# Patient Record
Sex: FEMALE | Race: BLACK OR AFRICAN AMERICAN | NOT HISPANIC OR LATINO | Employment: OTHER | URBAN - METROPOLITAN AREA
[De-identification: names, ages, dates, MRNs, and addresses within clinical notes are randomized per-mention and may not be internally consistent; named-entity substitution may affect disease eponyms.]

---

## 2017-03-27 ENCOUNTER — OFFICE VISIT (OUTPATIENT)
Dept: INTERNAL MEDICINE | Facility: CLINIC | Age: 42
End: 2017-03-27
Payer: MEDICARE

## 2017-03-27 VITALS
BODY MASS INDEX: 27.34 KG/M2 | WEIGHT: 148.56 LBS | SYSTOLIC BLOOD PRESSURE: 124 MMHG | TEMPERATURE: 97 F | HEIGHT: 62 IN | RESPIRATION RATE: 14 BRPM | DIASTOLIC BLOOD PRESSURE: 80 MMHG | HEART RATE: 80 BPM

## 2017-03-27 DIAGNOSIS — I34.1 MVP (MITRAL VALVE PROLAPSE): ICD-10-CM

## 2017-03-27 DIAGNOSIS — K21.9 GASTROESOPHAGEAL REFLUX DISEASE, ESOPHAGITIS PRESENCE NOT SPECIFIED: ICD-10-CM

## 2017-03-27 DIAGNOSIS — Q85.01 NEUROFIBROMATOSIS, TYPE I (VON RECKLINGHAUSEN'S DISEASE): ICD-10-CM

## 2017-03-27 DIAGNOSIS — E78.00 PURE HYPERCHOLESTEROLEMIA: ICD-10-CM

## 2017-03-27 DIAGNOSIS — F31.9 BIPOLAR AFFECTIVE DISORDER, REMISSION STATUS UNSPECIFIED: Primary | ICD-10-CM

## 2017-03-27 PROCEDURE — 99204 OFFICE O/P NEW MOD 45 MIN: CPT | Mod: S$GLB,,, | Performed by: INTERNAL MEDICINE

## 2017-03-27 PROCEDURE — 1160F RVW MEDS BY RX/DR IN RCRD: CPT | Mod: S$GLB,,, | Performed by: INTERNAL MEDICINE

## 2017-03-27 PROCEDURE — 99999 PR PBB SHADOW E&M-EST. PATIENT-LVL III: CPT | Mod: PBBFAC,,, | Performed by: INTERNAL MEDICINE

## 2017-03-27 RX ORDER — OXCARBAZEPINE 600 MG/1
600 TABLET, FILM COATED ORAL 2 TIMES DAILY
Refills: 0 | COMMUNITY
Start: 2017-03-03 | End: 2017-03-27 | Stop reason: ALTCHOICE

## 2017-03-27 RX ORDER — OLANZAPINE 10 MG/1
10 TABLET ORAL NIGHTLY
Refills: 0 | COMMUNITY
Start: 2017-03-03 | End: 2019-02-11

## 2017-03-27 RX ORDER — TRAZODONE HYDROCHLORIDE 50 MG/1
50 TABLET ORAL NIGHTLY
Refills: 0 | COMMUNITY
Start: 2017-03-01 | End: 2017-10-11

## 2017-03-27 RX ORDER — METOPROLOL SUCCINATE 25 MG/1
25 TABLET, EXTENDED RELEASE ORAL DAILY
Qty: 90 TABLET | Refills: 3 | Status: SHIPPED | OUTPATIENT
Start: 2017-03-27 | End: 2018-06-26 | Stop reason: SDUPTHER

## 2017-03-27 RX ORDER — ATORVASTATIN CALCIUM 10 MG/1
10 TABLET, FILM COATED ORAL DAILY
Qty: 90 TABLET | Refills: 4 | Status: SHIPPED | OUTPATIENT
Start: 2017-03-27 | End: 2018-06-26 | Stop reason: SDUPTHER

## 2017-03-27 NOTE — MR AVS SNAPSHOT
[facility] - Internal Medicine  [address] [place], [place] [address], [address]  [place] [address]  Phone: [phone]  Fax: [phone]                  Kenzie Saunders   3/27/2017 2:30 PM   Office Visit    Description:  Female : 1975   Provider:  José Luis Adams MD   Department:  [facility] - Internal Medicine           Reason for Visit     Establish Care           Diagnoses this Visit        Comments    Bipolar affective disorder, remission status unspecified    -  Primary     Gastroesophageal reflux disease, esophagitis presence not specified         Neurofibromatosis, type I (von Recklinghausen's disease)         MVP (mitral valve prolapse)         Pure hypercholesterolemia                To Do List           Goals (5 Years of Data)     None      Follow-Up and Disposition     Return in about 1 month (around 2017).       These Medications        Disp Refills Start End    atorvastatin (LIPITOR) 10 MG tablet 90 tablet 4 3/27/2017 3/27/2018    Take 1 tablet (10 mg total) by mouth once daily. - Oral    Pharmacy: [facility]/pharmacy #[id number] - [address] Ph #: [phone]       metoprolol succinate (TOPROL-XL) 25 MG 24 hr tablet 90 tablet 3 3/27/2017 3/27/2018    Take 1 tablet (25 mg total) by mouth once daily. - Oral    Pharmacy: [facility]/pharmacy #[id number] [address] Ph #: [phone]         [facility]     [facility] On Call Nurse Care Line -  Assistance  Registered nurses in the [facility] Center provide clinical advisement, health education, appointment booking, and other advisory services.  Call for this free service at [phone].             Medications           Message regarding Medications     Verify the changes and/or additions to your medication regime listed below are the same as discussed with your clinician today.  If any of these changes or additions are incorrect, please notify your  "healthcare provider.        START taking these NEW medications        Refills    atorvastatin (LIPITOR) 10 MG tablet 4    Sig: Take 1 tablet (10 mg total) by mouth once daily.    Class: Normal    Route: Oral    metoprolol succinate (TOPROL-XL) 25 MG 24 hr tablet 3    Sig: Take 1 tablet (25 mg total) by mouth once daily.    Class: Normal    Route: Oral      STOP taking these medications     oxcarbazepine (TRILEPTAL) 300 MG Tab TAKE 1 TABLET BY MOUTH AT BEDTIME FOR 3 DAYS THEN 1 TABLET TWICE A DAY    oxcarbazepine (TRILEPTAL) 600 MG Tab Take 600 mg by mouth 2 (two) times daily.           Verify that the below list of medications is an accurate representation of the medications you are currently taking.  If none reported, the list may be blank. If incorrect, please contact your healthcare provider. Carry this list with you in case of emergency.           Current Medications     atorvastatin (LIPITOR) 10 MG tablet Take 1 tablet (10 mg total) by mouth once daily.    DIVALPROEX SODIUM (DEPAKOTE ORAL) Take by mouth 2 (two) times daily. Patient hasn't picked up medication from pharmacy yet unsure of milligrams.    metoprolol succinate (TOPROL-XL) 25 MG 24 hr tablet Take 1 tablet (25 mg total) by mouth once daily.    olanzapine (ZYPREXA) 10 MG tablet Take 10 mg by mouth every evening.    trazodone (DESYREL) 50 MG tablet Take 50 mg by mouth every evening.           Clinical Reference Information           Your Vitals Were     BP Pulse Temp Resp Height Weight    124/80 80 97.1 °F (36.2 °C) 14 5' 2" (1.575 m) 67.4 kg (148 lb 9.4 oz)    BMI                27.18 kg/m2          Blood Pressure          Most Recent Value    BP  124/80      Allergies as of 3/27/2017     Haldol [Haloperidol Lactate]    Prevacid [Lansoprazole]    Risperidone Analogues      Immunizations Administered on Date of Encounter - 3/27/2017     None      Orders Placed During Today's Visit     Future Labs/Procedures Expected by Expires    Lipid panel  3/27/2017 " 5/26/2018      Language Assistance Services     ATTENTION: Language assistance services are available, free of charge. Please call [phone].      ATENCIÓN: Si solomon lux, tiene a simpson disposición servicios gratuitos de asistencia lingüística. Llame al [phone].     CHÚ Ý: N?u b?n nói Ti?ng Vi?t, có các d?ch v? h? tr? ngôn ng? mi?n phí dành cho b?n. G?i s? [phone].         [facility] complies with applicable Federal civil rights laws and does not discriminate on the basis of race, color, national origin, age, disability, or sex.

## 2017-03-27 NOTE — PROGRESS NOTES
Subjective:       Patient ID: Kenzie Saunders is a 41 y.o. female.    Chief Complaint: Establish Care    HPI  Pt establishing care.  Has a history of BAD, neurofibromatosis, MVP, GERD.  Pt c/o almost constant palpitations, no CP,SOB.  No recent depression or madelyn.  GERD is rare.  Had a slightly elevated LDL cholesterol last year, has a + FH of CAD.  Review of Systems   All other systems reviewed and are negative.      Objective:      Physical Exam   Constitutional: She appears well-developed. No distress.   HENT:   Head: Normocephalic.   Mouth/Throat: Oropharynx is clear and moist.   Eyes: EOM are normal.   Neck: Normal range of motion. No JVD present. No tracheal deviation present.   Cardiovascular: Normal rate, regular rhythm and intact distal pulses.    Murmur (2/6 SM LSB) heard.  Pulmonary/Chest: Effort normal and breath sounds normal. No respiratory distress.   Abdominal: Soft. Bowel sounds are normal. She exhibits no distension and no mass. There is no tenderness.   Musculoskeletal: Normal range of motion. She exhibits no edema.   Neurological: She is alert. No cranial nerve deficit. She exhibits normal muscle tone. Coordination normal.   Skin: Skin is warm and dry. No rash noted. She is not diaphoretic. No erythema.   Widespread neurofibromas   Psychiatric: She has a normal mood and affect. Her behavior is normal.   Vitals reviewed.      Assessment:       1. Bipolar affective disorder, remission status unspecified    2. Gastroesophageal reflux disease, esophagitis presence not specified    3. Neurofibromatosis, type I (von Recklinghausen's disease)    4. MVP (mitral valve prolapse)    5. Pure hypercholesterolemia        Plan:       Kenzie was seen today for establish care.    Diagnoses and all orders for this visit:    Bipolar affective disorder, remission status unspecified   Well-cont    Gastroesophageal reflux disease, esophagitis presence not specified   PPI prn    Neurofibromatosis, type I (von  Recklinghausen's disease)    MVP (mitral valve prolapse)  -     metoprolol succinate (TOPROL-XL) 25 MG 24 hr tablet; Take 1 tablet (25 mg total) by mouth once daily.    Pure hypercholesterolemia  -     Lipid panel; Future  -     atorvastatin (LIPITOR) 10 MG tablet; Take 1 tablet (10 mg total) by mouth once daily.      Return in about 1 month (around 4/27/2017).

## 2017-04-20 ENCOUNTER — LAB VISIT (OUTPATIENT)
Dept: LAB | Facility: HOSPITAL | Age: 42
End: 2017-04-20
Attending: INTERNAL MEDICINE
Payer: MEDICARE

## 2017-04-20 DIAGNOSIS — E78.00 PURE HYPERCHOLESTEROLEMIA: ICD-10-CM

## 2017-04-20 LAB
CHOLEST/HDLC SERPL: 2.9 {RATIO}
HDL/CHOLESTEROL RATIO: 34.5 %
HDLC SERPL-MCNC: 168 MG/DL
HDLC SERPL-MCNC: 58 MG/DL
LDLC SERPL CALC-MCNC: 92.6 MG/DL
NONHDLC SERPL-MCNC: 110 MG/DL
TRIGL SERPL-MCNC: 87 MG/DL

## 2017-04-20 PROCEDURE — 80061 LIPID PANEL: CPT

## 2017-04-20 PROCEDURE — 36415 COLL VENOUS BLD VENIPUNCTURE: CPT | Mod: PO

## 2017-04-27 ENCOUNTER — OFFICE VISIT (OUTPATIENT)
Dept: INTERNAL MEDICINE | Facility: CLINIC | Age: 42
End: 2017-04-27
Payer: MEDICARE

## 2017-04-27 VITALS
HEART RATE: 72 BPM | HEIGHT: 62 IN | DIASTOLIC BLOOD PRESSURE: 72 MMHG | SYSTOLIC BLOOD PRESSURE: 100 MMHG | TEMPERATURE: 98 F | RESPIRATION RATE: 14 BRPM | WEIGHT: 161 LBS | BODY MASS INDEX: 29.63 KG/M2

## 2017-04-27 DIAGNOSIS — K21.9 GASTROESOPHAGEAL REFLUX DISEASE, ESOPHAGITIS PRESENCE NOT SPECIFIED: ICD-10-CM

## 2017-04-27 DIAGNOSIS — I34.1 MVP (MITRAL VALVE PROLAPSE): Primary | ICD-10-CM

## 2017-04-27 DIAGNOSIS — Q85.01 NEUROFIBROMATOSIS, TYPE I (VON RECKLINGHAUSEN'S DISEASE): ICD-10-CM

## 2017-04-27 DIAGNOSIS — Z23 NEED FOR PROPHYLACTIC VACCINATION AND INOCULATION AGAINST OTHER SPECIFIED DISEASE: ICD-10-CM

## 2017-04-27 DIAGNOSIS — Z80.3 FAMILY HISTORY OF BREAST CANCER IN SISTER: ICD-10-CM

## 2017-04-27 DIAGNOSIS — E78.00 PURE HYPERCHOLESTEROLEMIA: ICD-10-CM

## 2017-04-27 PROCEDURE — 99999 PR PBB SHADOW E&M-EST. PATIENT-LVL III: CPT | Mod: PBBFAC,,, | Performed by: INTERNAL MEDICINE

## 2017-04-27 PROCEDURE — 99213 OFFICE O/P EST LOW 20 MIN: CPT | Mod: 25,S$GLB,, | Performed by: INTERNAL MEDICINE

## 2017-04-27 PROCEDURE — 90471 IMMUNIZATION ADMIN: CPT | Mod: S$GLB,,, | Performed by: INTERNAL MEDICINE

## 2017-04-27 PROCEDURE — 1160F RVW MEDS BY RX/DR IN RCRD: CPT | Mod: S$GLB,,, | Performed by: INTERNAL MEDICINE

## 2017-04-27 PROCEDURE — 90715 TDAP VACCINE 7 YRS/> IM: CPT | Mod: S$GLB,,, | Performed by: INTERNAL MEDICINE

## 2017-04-27 RX ORDER — DIVALPROEX SODIUM 250 MG/1
TABLET, FILM COATED, EXTENDED RELEASE ORAL
Refills: 0 | COMMUNITY
Start: 2017-03-31 | End: 2019-02-11

## 2017-04-27 NOTE — MR AVS SNAPSHOT
[facility] - Internal Medicine  [address] [place] [address] [address], [address]  [place] [address]  Phone: [phone]  Fax: [phone]                  Kenzie Saunders   2017 9:15 AM   Office Visit    Description:  Female : 1975   Provider:  José Luis Adams MD   Department:  [facility] - Internal Medicine           Reason for Visit     Follow-up     Results     Back Pain           Diagnoses this Visit        Comments    MVP (mitral valve prolapse)    -  Primary     Family history of breast cancer in sister         Pure hypercholesterolemia         Neurofibromatosis, type I (von Recklinghausen's disease)         Gastroesophageal reflux disease, esophagitis presence not specified         Need for prophylactic vaccination and inoculation against other specified disease                To Do List           Goals (5 Years of Data)     None      Follow-Up and Disposition     Return in about 1 year (around 2018).      [facility] On Call     [facility] On Call Nurse Care Line -  Assistance  Unless otherwise directed by your provider, please contact [facility], our nurse care line that is available for  assistance.     Registered nurses in the [facility] Call Center provide: appointment scheduling, clinical advisement, health education, and other advisory services.  Call: [phone] (toll free)               Medications           Message regarding Medications     Verify the changes and/or additions to your medication regime listed below are the same as discussed with your clinician today.  If any of these changes or additions are incorrect, please notify your healthcare provider.        STOP taking these medications     DIVALPROEX SODIUM (DEPAKOTE ORAL) Take by mouth 2 (two) times daily. Patient hasn't picked up medication from pharmacy yet unsure of milligrams.           Verify that the below list of medications is an accurate representation of the medications you are currently taking.  If none reported, the  "list may be blank. If incorrect, please contact your healthcare provider. Carry this list with you in case of emergency.           Current Medications     atorvastatin (LIPITOR) 10 MG tablet Take 1 tablet (10 mg total) by mouth once daily.    divalproex ER (DEPAKOTE ER) 250 MG 24 hr tablet TAKE 1 TABLET BY MOUTH AT BEDTIME FOR 7 DAYS THEN 1 TABLET BY MOUTH TWICE A DAY    metoprolol succinate (TOPROL-XL) 25 MG 24 hr tablet Take 1 tablet (25 mg total) by mouth once daily.    olanzapine (ZYPREXA) 10 MG tablet Take 10 mg by mouth every evening.    trazodone (DESYREL) 50 MG tablet Take 50 mg by mouth every evening.           Clinical Reference Information           Your Vitals Were     BP Pulse Temp Resp Height Weight    100/72 72 97.6 °F (36.4 °C) (Oral) 14 5' 2" (1.575 m) 73 kg (161 lb)    BMI                29.45 kg/m2          Blood Pressure          Most Recent Value    BP  100/72      Allergies as of 4/27/2017     Haldol [Haloperidol Lactate]    Prevacid [Lansoprazole]    Risperidone Analogues      Immunizations Administered on Date of Encounter - 4/27/2017     Name Date Dose VIS Date Route    TDAP  Incomplete 0.5 mL 2/24/2015 Intramuscular      Orders Placed During Today's Visit      Normal Orders This Visit    Tdap Vaccine     Future Labs/Procedures Expected by Expires    Mammo Digital Screening Bilat with CAD  4/27/2017 6/27/2018      Language Assistance Services     ATTENTION: Language assistance services are available, free of charge. Please call [phone].      ATENCIÓN: Si habla español, tiene a simpson disposición servicios gratuitos de asistencia lingüística. Llame al [phone].     [facility] Ý: N?u b?n nói Ti?ng Vi?t, có các d?ch v? h? tr? ngôn ng? mi?n phí dành cho b?n. G?i s? [phone].         [facility] - Internal Medicine complies with applicable Federal civil rights laws and does not discriminate on the basis of race, color, national origin, age, disability, or sex.        "

## 2017-04-27 NOTE — PROGRESS NOTES
Subjective:       Patient ID: Kenzie Saunders is a 41 y.o. female.    Chief Complaint: Follow-up; Results; and Back Pain (lower back and left leg)    HPI  Recheck  Labs reviewed.  Having much less palpitations.  Having occasional orthostasis.  Has some minor LBP.  Review of Systems    Objective:      Physical Exam   Constitutional: She appears well-developed. No distress.   HENT:   Head: Normocephalic.   Mouth/Throat: Oropharynx is clear and moist.   Eyes: EOM are normal.   Neck: Normal range of motion. No JVD present. No tracheal deviation present.   Cardiovascular: Normal rate, regular rhythm and intact distal pulses.    Murmur (2/6 SM LSB) heard.  Pulmonary/Chest: Effort normal and breath sounds normal. No respiratory distress.   Abdominal: Soft. Bowel sounds are normal. She exhibits no distension and no mass. There is no tenderness.   Musculoskeletal: Normal range of motion. She exhibits no edema.   Neurological: She is alert. No cranial nerve deficit. She exhibits normal muscle tone. Coordination normal.   Skin: Skin is warm and dry. No rash noted. She is not diaphoretic. No erythema.   Widespread neurofibromas   Psychiatric: She has a normal mood and affect. Her behavior is normal.   Vitals reviewed.      Assessment:       1. MVP (mitral valve prolapse)    2. Family history of breast cancer in sister    3. Pure hypercholesterolemia    4. Neurofibromatosis, type I (von Recklinghausen's disease)    5. Gastroesophageal reflux disease, esophagitis presence not specified    6. Need for prophylactic vaccination and inoculation against other specified disease        Plan:       Kenzie was seen today for follow-up, results and back pain.    Diagnoses and all orders for this visit:    MVP (mitral valve prolapse)   Controlled    Family history of breast cancer in sister  -     Mammo Digital Screening Bilat with CAD; Future    Pure hypercholesterolemia   Well-cont    Neurofibromatosis, type I (von Recklinghausen's  disease)    Gastroesophageal reflux disease, esophagitis presence not specified    Need for prophylactic vaccination and inoculation against other specified disease  -     Tdap Vaccine      Return in about 1 year (around 4/27/2018).

## 2017-05-05 ENCOUNTER — HOSPITAL ENCOUNTER (OUTPATIENT)
Dept: RADIOLOGY | Facility: HOSPITAL | Age: 42
Discharge: HOME | End: 2017-05-05
Attending: INTERNAL MEDICINE
Payer: MEDICARE

## 2017-05-05 DIAGNOSIS — Z80.3 FAMILY HISTORY OF BREAST CANCER IN SISTER: ICD-10-CM

## 2017-05-05 PROCEDURE — 77067 SCR MAMMO BI INCL CAD: CPT | Mod: TC

## 2017-10-11 ENCOUNTER — OFFICE VISIT (OUTPATIENT)
Dept: INTERNAL MEDICINE | Facility: CLINIC | Age: 42
End: 2017-10-11
Payer: MEDICARE

## 2017-10-11 VITALS
BODY MASS INDEX: 30.26 KG/M2 | DIASTOLIC BLOOD PRESSURE: 78 MMHG | TEMPERATURE: 98 F | WEIGHT: 164.44 LBS | HEIGHT: 62 IN | HEART RATE: 76 BPM | SYSTOLIC BLOOD PRESSURE: 111 MMHG | RESPIRATION RATE: 20 BRPM

## 2017-10-11 DIAGNOSIS — Z79.899 HIGH RISK MEDICATION USE: ICD-10-CM

## 2017-10-11 DIAGNOSIS — E66.9 OBESITY (BMI 30-39.9): Primary | ICD-10-CM

## 2017-10-11 DIAGNOSIS — Z23 NEED FOR PROPHYLACTIC VACCINATION AND INOCULATION AGAINST INFLUENZA: ICD-10-CM

## 2017-10-11 DIAGNOSIS — E78.00 PURE HYPERCHOLESTEROLEMIA: ICD-10-CM

## 2017-10-11 DIAGNOSIS — Q85.01 NEUROFIBROMATOSIS, TYPE I (VON RECKLINGHAUSEN'S DISEASE): ICD-10-CM

## 2017-10-11 DIAGNOSIS — F31.9 BIPOLAR AFFECTIVE DISORDER, REMISSION STATUS UNSPECIFIED: ICD-10-CM

## 2017-10-11 PROCEDURE — G0008 ADMIN INFLUENZA VIRUS VAC: HCPCS | Mod: S$GLB,,, | Performed by: INTERNAL MEDICINE

## 2017-10-11 PROCEDURE — 99214 OFFICE O/P EST MOD 30 MIN: CPT | Mod: S$GLB,,, | Performed by: INTERNAL MEDICINE

## 2017-10-11 PROCEDURE — 90686 IIV4 VACC NO PRSV 0.5 ML IM: CPT | Mod: S$GLB,,, | Performed by: INTERNAL MEDICINE

## 2017-10-11 PROCEDURE — 99999 PR PBB SHADOW E&M-EST. PATIENT-LVL III: CPT | Mod: PBBFAC,,, | Performed by: INTERNAL MEDICINE

## 2017-10-11 NOTE — PROGRESS NOTES
Subjective:       Patient ID: Kenzie Saunders is a 42 y.o. female.    Chief Complaint: Weight Gain (symptoms x 4 weeks, 10lb weight gain per psychatrist) and Flu Vaccine    HPI  Pt sent here by psych for weight gain. Pt has gained 37 lbs since 8/16.  She gets no exercise and drinks a lot of sugary drinks.  Pt c/o depression, tearfulness, just had her psych meds adjusted.  C/O chronic epigastric pain since lap choly 2 years ago.  Review of Systems   All other systems reviewed and are negative.      Objective:      Physical Exam   Constitutional: She appears well-developed. No distress.   HENT:   Head: Normocephalic.   Mouth/Throat: Oropharynx is clear and moist.   Eyes: EOM are normal.   Neck: Normal range of motion. No JVD present. No tracheal deviation present.   Cardiovascular: Normal rate, regular rhythm and intact distal pulses.    Murmur (2/6 SM LSB) heard.  Pulmonary/Chest: Effort normal and breath sounds normal. No respiratory distress.   Abdominal: Soft. Bowel sounds are normal. She exhibits no distension and no mass. There is no tenderness.   Musculoskeletal: Normal range of motion. She exhibits no edema.   Neurological: She is alert. No cranial nerve deficit. She exhibits normal muscle tone. Coordination normal.   Skin: Skin is warm and dry. No rash noted. She is not diaphoretic. No erythema.   Widespread neurofibromas   Psychiatric: She has a normal mood and affect. Her behavior is normal.   Vitals reviewed.      Assessment:       1. Obesity (BMI 30-39.9)    2. Pure hypercholesterolemia    3. Neurofibromatosis, type I (von Recklinghausen's disease)    4. Bipolar affective disorder, remission status unspecified    5. High risk medication use    6. Need for prophylactic vaccination and inoculation against influenza        Plan:       Kenzie was seen today for weight gain and flu vaccine.    Diagnoses and all orders for this visit:    Obesity (BMI 30-39.9)  -     TSH; Future  -     Ambulatory Referral to  [facility]   D/c sugary drinks    Pure hypercholesterolemia   Well-cont    Neurofibromatosis, type I (von Recklinghausen's disease)    Bipolar affective disorder, remission status unspecified    High risk medication use  -     CBC auto differential; Future  -     Comprehensive metabolic panel; Future    Need for prophylactic vaccination and inoculation against influenza  -     Influenza - Quadrivalent (3 years & older) (PF)      Return if symptoms worsen or fail to improve.

## 2017-10-13 ENCOUNTER — LAB VISIT (OUTPATIENT)
Dept: LAB | Facility: HOSPITAL | Age: 42
End: 2017-10-13
Attending: INTERNAL MEDICINE
Payer: MEDICARE

## 2017-10-13 DIAGNOSIS — E66.9 OBESITY (BMI 30-39.9): ICD-10-CM

## 2017-10-13 DIAGNOSIS — Z79.899 HIGH RISK MEDICATION USE: ICD-10-CM

## 2017-10-13 LAB
ALBUMIN SERPL BCP-MCNC: 3.5 G/DL
ALP SERPL-CCNC: 65 U/L
ALT SERPL W/O P-5'-P-CCNC: 19 U/L
ANION GAP SERPL CALC-SCNC: 9 MMOL/L
AST SERPL-CCNC: 18 U/L
BASOPHILS # BLD AUTO: 0.03 K/UL
BASOPHILS NFR BLD: 0.6 %
BILIRUB SERPL-MCNC: 0.3 MG/DL
BUN SERPL-MCNC: 11 MG/DL
CALCIUM SERPL-MCNC: 8.5 MG/DL
CHLORIDE SERPL-SCNC: 107 MMOL/L
CO2 SERPL-SCNC: 23 MMOL/L
CREAT SERPL-MCNC: 0.56 MG/DL
DIFFERENTIAL METHOD: ABNORMAL
EOSINOPHIL # BLD AUTO: 0.2 K/UL
EOSINOPHIL NFR BLD: 4 %
ERYTHROCYTE [DISTWIDTH] IN BLOOD BY AUTOMATED COUNT: 12.3 %
EST. GFR  (AFRICAN AMERICAN): >60 ML/MIN/1.73 M^2
EST. GFR  (NON AFRICAN AMERICAN): >60 ML/MIN/1.73 M^2
GLUCOSE SERPL-MCNC: 83 MG/DL
HCT VFR BLD AUTO: 37.2 %
HGB BLD-MCNC: 12.2 G/DL
LYMPHOCYTES # BLD AUTO: 1.9 K/UL
LYMPHOCYTES NFR BLD: 35 %
MCH RBC QN AUTO: 27.9 PG
MCHC RBC AUTO-ENTMCNC: 32.8 G/DL
MCV RBC AUTO: 85 FL
MONOCYTES # BLD AUTO: 0.9 K/UL
MONOCYTES NFR BLD: 16 %
NEUTROPHILS # BLD AUTO: 2.4 K/UL
NEUTROPHILS NFR BLD: 44 %
PLATELET # BLD AUTO: 240 K/UL
PMV BLD AUTO: 10.6 FL
POTASSIUM SERPL-SCNC: 3.6 MMOL/L
PROT SERPL-MCNC: 6.8 G/DL
RBC # BLD AUTO: 4.38 M/UL
SODIUM SERPL-SCNC: 139 MMOL/L
T4 FREE SERPL-MCNC: 0.81 NG/DL
TSH SERPL DL<=0.005 MIU/L-ACNC: 4.32 UIU/ML
WBC # BLD AUTO: 5.45 K/UL

## 2017-10-13 PROCEDURE — 85025 COMPLETE CBC W/AUTO DIFF WBC: CPT | Mod: PO

## 2017-10-13 PROCEDURE — 80053 COMPREHEN METABOLIC PANEL: CPT | Mod: PO

## 2017-10-13 PROCEDURE — 84443 ASSAY THYROID STIM HORMONE: CPT | Mod: PO

## 2017-10-13 PROCEDURE — 36415 COLL VENOUS BLD VENIPUNCTURE: CPT | Mod: PO

## 2017-10-13 PROCEDURE — 84439 ASSAY OF FREE THYROXINE: CPT

## 2017-10-23 ENCOUNTER — TELEPHONE (OUTPATIENT)
Dept: INTERNAL MEDICINE | Facility: CLINIC | Age: 42
End: 2017-10-23

## 2017-10-23 NOTE — TELEPHONE ENCOUNTER
----- Message from Onelia Gould sent at 10/23/2017  8:50 AM CDT -----  Contact: The pt called  Calling to get lab results.  ALSO, please mail a copy of lab results.  She can be reached at [phone]

## 2018-01-15 ENCOUNTER — OFFICE VISIT (OUTPATIENT)
Dept: NEUROLOGY | Facility: CLINIC | Age: 43
End: 2018-01-15
Payer: MEDICARE

## 2018-01-15 VITALS
HEART RATE: 65 BPM | WEIGHT: 164.44 LBS | HEIGHT: 62 IN | DIASTOLIC BLOOD PRESSURE: 77 MMHG | BODY MASS INDEX: 30.26 KG/M2 | SYSTOLIC BLOOD PRESSURE: 118 MMHG

## 2018-01-15 DIAGNOSIS — Q85.01 NEUROFIBROMATOSIS, TYPE 1 (VON RECKLINGHAUSEN'S DISEASE): Primary | ICD-10-CM

## 2018-01-15 PROCEDURE — 99214 OFFICE O/P EST MOD 30 MIN: CPT | Mod: S$GLB,,, | Performed by: PSYCHIATRY & NEUROLOGY

## 2018-01-15 PROCEDURE — 99999 PR PBB SHADOW E&M-EST. PATIENT-LVL IV: CPT | Mod: PBBFAC,,, | Performed by: PSYCHIATRY & NEUROLOGY

## 2018-01-15 RX ORDER — LORAZEPAM 1 MG/1
TABLET ORAL
Qty: 2 TABLET | Refills: 0 | Status: SHIPPED | OUTPATIENT
Start: 2018-01-15 | End: 2018-01-19 | Stop reason: SDUPTHER

## 2018-01-15 NOTE — PROGRESS NOTES
[facility] NEUROLOGY  [facility]    Date: January 15, 2018   Patient Name: Kenzie Saunders   MRN: [id number]   PCP: José Luis Adams  Referring Provider: No ref. provider found    Assessment:      This is Kenzie Saunders, 42 y.o. female with a history of neurofibromatosis type 1 who presents with left sided back and radicular leg pain with a large left lumbar plexiform neurofibroma.  We will obtain updated neuraxis imaging and have again referred patient to plastic surgery as neurofibromas rapidly increasing in size and likely contributing to patient's low back pain.     Plan:      Problem List Items Addressed This Visit     None      Visit Diagnoses     Neurofibromatosis, type 1 (von Recklinghausen's disease)    -  Primary    Relevant Orders    Ambulatory consult to Plastic Surgery    MRI Brain Without Contrast    MRI Cervical Spine Without Contrast    MRI Thoracic Spine W WO Contrast    MRI Lumbar Spine Without Contrast          Humberto Kenny MD  [facility]   Department of Neurology    Patient note was created using Dragon Dictation.  Any errors in syntax or even information may not have been identified and edited on initial review prior to signing this note.  Subjective:   Patient seen in consultation at the request of Dr. Shipley for the evaluation of back pain. A copy of this note will be sent to the referring physician.      HPI:   Ms. Kenzie Saunders is a 42 y.o. female who presents in follow-up for neurofibromatosis type I.  The patient continues to complain of lumbar back pain stating that she feels that the large pedunculated plexiform neurofibroma in her left lumbar region has increased in size and is worsening her back pain.  She states that the surgeon after her last visit here.  However, she states that she has weighed the mass and found to be over 5 pounds .  Believes this is likely worsening her low back pain.  She has no other complaints today.    PAST  "MEDICAL HISTORY:  Past Medical History:   Diagnosis Date    Astigmatism     Bipolar 2 disorder     GERD (gastroesophageal reflux disease)     Migraine headache     Neurofibromatosis, type 1 (von Recklinghausen's disease)     Sciatica      PAST SURGICAL HISTORY:  Past Surgical History:   Procedure Laterality Date    HYSTERECTOMY       CURRENT MEDS:  Current Outpatient Prescriptions   Medication Sig Dispense Refill    atorvastatin (LIPITOR) 10 MG tablet Take 1 tablet (10 mg total) by mouth once daily. 90 tablet 4    metoprolol succinate (TOPROL-XL) 25 MG 24 hr tablet Take 1 tablet (25 mg total) by mouth once daily. 90 tablet 3    olanzapine (ZYPREXA) 10 MG tablet Take 10 mg by mouth every evening.  0    divalproex ER (DEPAKOTE ER) 250 MG 24 hr tablet TAKE 1 TABLET BY MOUTH AT BEDTIME FOR 7 DAYS THEN 1 TABLET BY MOUTH TWICE A DAY  0     No current facility-administered medications for this visit.      ALLERGIES:  Allergies   Allergen Reactions    Haldol [Haloperidol Lactate] Hives    Prevacid [Lansoprazole] Hives    Risperidone Analogues Other (See Comments)     Elevates prolactin levels       FAMILY HISTORY:  Family History   Problem Relation Age of Onset    Neurofibromatosis Mother     Neurofibromatosis Maternal Aunt     Neurofibromatosis Maternal Grandfather     Neurofibromatosis Cousin      SOCIAL HISTORY:  Social History   Substance Use Topics    Smoking status: Never Smoker    Smokeless tobacco: Never Used    Alcohol use No     Review of Systems:  12 review of systems is negative except for the symptoms mentioned in HPI.      Objective:     Vitals:    01/15/18 1111   BP: 118/77   Pulse: 65   Weight: 74.6 kg (164 lb 7.4 oz)   Height: 5' 2" (1.575 m)       General: NAD, well nourished   Eyes: no tearing, discharge, no erythema, Lisch nodules seen bilaterally, unable to appreciate gliomas or hamartomas  ENT: moist mucous membranes of the oral cavity, nares patent    Neck: Supple, full range of " motion  Cardiovascular: Warm and well perfused, pulses equal and symmetrical  Lungs: Normal work of breathing, normal chest wall excursions  Skin: Innumerable neurofibromas of face, extremities, and trunk. Axillary freckling. Massive pedunculated neurofibroma of left lumbosacral region.   Psychiatry: Mood and affect are appropriate   Abdomen: soft, non tender, non distended  Extremities: Multiple plexiform neurofibromas of arms and legs    Neurological   MENTAL STATUS: Alert and oriented to person, place, and time. Attention and concentration within normal limits. Speech without dysarthria, able to name and repeat without difficulty. Recent and remote memory within normal limits   CRANIAL NERVES: Visual fields intact. PERRL. EOMI. Facial sensation intact. Face symmetrical. Hearing grossly intact. Full shoulder shrug bilaterally. Tongue protrudes midline   SENSORY: Sensation is intact to light touch throughout.    MOTOR: Normal bulk and tone. No pronator drift.  5/5 deltoid, biceps, triceps, interosseous, hand  bilaterally. 5/5 iliopsoas, knee extension/flexion, foot dorsi/plantarflexion bilaterally.    REFLEXES: Symmetric and 2+ throughout.   CEREBELLAR/COORDINATION/GAIT: Gait steady with normal arm swing and stride length. Finger to nose intact. Normal rapid alternating movements.

## 2018-01-15 NOTE — PATIENT INSTRUCTIONS
Magnetic Resonance Imaging (MRI)     You will be asked to hold very still during the scan.     Magnetic resonance imaging (MRI) is a test that lets your doctor see detailed pictures of the inside of your body. MRI combines the use of strong magnets and radio waves to form an MRI image.  How do I get ready for an MRI?  · Follow any directions you are given for not eating or drinking before the test.  · Ask your provider if you should stop taking any medicine before the test.  · Follow your normal daily routine unless your provider tells you otherwise.  · You'll be asked to remove your watch, jewelry, hearing aids, credit cards, pens, pocket knives, eyeglasses, and other metal objects.  · You may be asked to remove your makeup. Makeup may contain some metal.  · Most MRI tests take 30 to 60 minutes. Depending on the type of MRI you are having, the test may take longer. Give yourself extra time to check in.     MRI uses strong magnets. Metal is affected by magnets and can distort the image. The magnet used in MRI can cause metal objects in your body to move. If you have a metal implant, you may not be able to have an MRI unless the implant is certified as MRI safe. People with these implants should not have an MRI:  · Ear (cochlear) implants  · Certain clips used for brain aneurysms  · Certain metal coils put in blood vessels  · Most defibrillators  · Most pacemakers  Be sure to tell the radiologist or technologist if you:  · Have had any previous surgeries  · Have a pacemaker, surgical clips, metal plate or pins, an artificial joint, staples or screws, ear (cochlear) implants, or other implants  · Wear a medicated adhesive patch  · Have metal splinters in your body  · Have implanted nerve stimulators or drug-infusion ports  · Have tattoos or body piercings. Some tattoo inks contain metal.  · Work with metal  · Have braces. You must remove any dental work.  · Have a bullet or other metal in your body  Also tell the  radiologist or technologist if you:  · Are pregnant or think you may be  · Are afraid of small, enclosed spaces (claustrophobic)  · Are allergic to X-ray dye (contrast medium), iodine, shellfish, or any medicines  · Have other allergies  · Are breastfeeding  · Have a history of cancer  · Have any serious health problems. This includes kidney disease or a liver transplant. You may not be able to have the contrast material used for MRI.   What happens during an MRI?  · You may be asked to wear a hospital gown.  · You may be given earplugs to wear if you need them.  · You may be injected with a special dye (contrast) that improves the MRI image.   · Youll lie down on a platform that slides into the magnet.  What happens after an MRI?  · You can get back to normal activities right away. If you were given contrast, it will pass naturally through your body within a day. You may be told to drink more water or other fluids during this time.   · Your doctor will discuss the test results with you during a follow-up appointment or over the phone.  · Your next appointment is: __________________  Date Last Reviewed: 6/2/2015  © 5821-4322 [employer]. [address] [place] [address], [place] [address]. All rights reserved. This information is not intended as a substitute for professional medical care. Always follow your healthcare professional's instructions.

## 2018-01-19 ENCOUNTER — HOSPITAL ENCOUNTER (OUTPATIENT)
Dept: RADIOLOGY | Facility: HOSPITAL | Age: 43
Discharge: HOME | End: 2018-01-19
Attending: PSYCHIATRY & NEUROLOGY
Payer: MEDICARE

## 2018-01-19 ENCOUNTER — TELEPHONE (OUTPATIENT)
Dept: NEUROLOGY | Facility: CLINIC | Age: 43
End: 2018-01-19

## 2018-01-19 DIAGNOSIS — Q85.01 NEUROFIBROMATOSIS, TYPE 1 (VON RECKLINGHAUSEN'S DISEASE): ICD-10-CM

## 2018-01-19 PROCEDURE — 72148 MRI LUMBAR SPINE W/O DYE: CPT | Mod: TC,PO

## 2018-01-19 PROCEDURE — 70551 MRI BRAIN STEM W/O DYE: CPT | Mod: TC,PO

## 2018-01-19 PROCEDURE — 72141 MRI NECK SPINE W/O DYE: CPT | Mod: TC,PO

## 2018-01-19 RX ORDER — LORAZEPAM 1 MG/1
TABLET ORAL
Qty: 2 TABLET | Refills: 0 | Status: SHIPPED | OUTPATIENT
Start: 2018-01-19 | End: 2019-02-11

## 2018-01-19 NOTE — TELEPHONE ENCOUNTER
----- Message from Yazmin Abraham MA sent at 1/19/2018  4:52 PM CST -----  Contact: [phone]/self      ----- Message -----  From: Lis Rodríguez  Sent: 1/19/2018   4:01 PM  To: Efraín OLIVAREZ Staff    Patient is returning your call. She is also requesting more VALIUM because she will be having another MRI done on 1/22/18. Please advise.

## 2018-01-22 ENCOUNTER — TELEPHONE (OUTPATIENT)
Dept: NEUROLOGY | Facility: CLINIC | Age: 43
End: 2018-01-22

## 2018-01-22 ENCOUNTER — HOSPITAL ENCOUNTER (OUTPATIENT)
Dept: RADIOLOGY | Facility: HOSPITAL | Age: 43
Discharge: HOME | End: 2018-01-22
Attending: PSYCHIATRY & NEUROLOGY
Payer: MEDICARE

## 2018-01-22 PROCEDURE — 72157 MRI CHEST SPINE W/O & W/DYE: CPT | Mod: TC,PO

## 2018-01-22 PROCEDURE — 25500020 PHARM REV CODE 255: Mod: PO | Performed by: PSYCHIATRY & NEUROLOGY

## 2018-01-22 PROCEDURE — A9585 GADOBUTROL INJECTION: HCPCS | Mod: PO | Performed by: PSYCHIATRY & NEUROLOGY

## 2018-01-22 RX ORDER — GADOBUTROL 604.72 MG/ML
8 INJECTION INTRAVENOUS
Status: COMPLETED | OUTPATIENT
Start: 2018-01-22 | End: 2018-01-22

## 2018-01-22 RX ADMIN — GADOBUTROL 8 ML: 604.72 INJECTION INTRAVENOUS at 10:01

## 2018-04-09 ENCOUNTER — OFFICE VISIT (OUTPATIENT)
Dept: CARDIOLOGY | Facility: CLINIC | Age: 43
End: 2018-04-09
Payer: MEDICARE

## 2018-04-09 VITALS
WEIGHT: 152.31 LBS | SYSTOLIC BLOOD PRESSURE: 104 MMHG | BODY MASS INDEX: 27.86 KG/M2 | HEART RATE: 66 BPM | OXYGEN SATURATION: 95 % | DIASTOLIC BLOOD PRESSURE: 71 MMHG

## 2018-04-09 DIAGNOSIS — R06.09 DOE (DYSPNEA ON EXERTION): ICD-10-CM

## 2018-04-09 DIAGNOSIS — I34.1 MVP (MITRAL VALVE PROLAPSE): ICD-10-CM

## 2018-04-09 DIAGNOSIS — E78.00 PURE HYPERCHOLESTEROLEMIA: ICD-10-CM

## 2018-04-09 DIAGNOSIS — F31.9 BIPOLAR AFFECTIVE DISORDER, REMISSION STATUS UNSPECIFIED: ICD-10-CM

## 2018-04-09 DIAGNOSIS — Q85.01 NEUROFIBROMATOSIS, TYPE I (VON RECKLINGHAUSEN'S DISEASE): ICD-10-CM

## 2018-04-09 DIAGNOSIS — R07.9 CHEST PAIN, UNSPECIFIED TYPE: Primary | ICD-10-CM

## 2018-04-09 PROCEDURE — 99204 OFFICE O/P NEW MOD 45 MIN: CPT | Mod: S$GLB,,, | Performed by: INTERNAL MEDICINE

## 2018-04-09 PROCEDURE — 93000 ELECTROCARDIOGRAM COMPLETE: CPT | Mod: S$GLB,,, | Performed by: INTERNAL MEDICINE

## 2018-04-09 PROCEDURE — 99499 UNLISTED E&M SERVICE: CPT | Mod: S$GLB,,, | Performed by: INTERNAL MEDICINE

## 2018-04-09 PROCEDURE — 99999 PR PBB SHADOW E&M-EST. PATIENT-LVL III: CPT | Mod: PBBFAC,,, | Performed by: INTERNAL MEDICINE

## 2018-04-09 NOTE — PROGRESS NOTES
Subjective:   Patient ID:  Kenzie Saunders is a 42 y.o. female who presents for evaluation of Establish Care      HPI: 41 y/o AA female with MVP, HLD and neurofibromatosis type 1 present to establish care. She use to be seen by Dr. Bill Kelly but he has moved out of town. She is having chest pain occasionally with exertion associated with PADGETT with minimal activities. BP is controlled. Discomfort is located mid sternally with radiation to left arm. Discomfort occurs at rest alsop and last seconds to minutes. She never smoked but does have family history of CAD and heart disease. She is here with her sister. Patient is Bipolar and is cared for by family. They recently lost father to heart disease and concerned with current symptoms. No orthopnea or PND.   No fever/chills.     Past Medical History:   Diagnosis Date    Astigmatism     Bipolar 2 disorder     GERD (gastroesophageal reflux disease)     Migraine headache     Neurofibromatosis, type 1 (von Recklinghausen's disease)     Sciatica        Past Surgical History:   Procedure Laterality Date    HYSTERECTOMY         Social History   Substance Use Topics    Smoking status: Never Smoker    Smokeless tobacco: Never Used    Alcohol use No       Family History   Problem Relation Age of Onset    Neurofibromatosis Mother     Neurofibromatosis Maternal Aunt     Neurofibromatosis Maternal Grandfather     Neurofibromatosis Cousin        Patient's Medications   New Prescriptions    No medications on file   Previous Medications    ATORVASTATIN (LIPITOR) 10 MG TABLET    Take 1 tablet (10 mg total) by mouth once daily.    DIVALPROEX ER (DEPAKOTE ER) 250 MG 24 HR TABLET    TAKE 1 TABLET BY MOUTH AT BEDTIME FOR 7 DAYS THEN 1 TABLET BY MOUTH TWICE A DAY    LORAZEPAM (ATIVAN) 1 MG TABLET    1 tab 30 min prior to MRI, may repeat x1. Do not drive while taking this medication.    METOPROLOL SUCCINATE (TOPROL-XL) 25 MG 24 HR TABLET    Take 1 tablet (25 mg total) by  mouth once daily.    OLANZAPINE (ZYPREXA) 10 MG TABLET    Take 10 mg by mouth every evening.   Modified Medications    No medications on file   Discontinued Medications    No medications on file        Review of patient's allergies indicates:   Allergen Reactions    Haldol [haloperidol lactate] Hives    Prevacid [lansoprazole] Hives    Risperidone analogues Other (See Comments)     Elevates prolactin levels       Review of Systems   Constitution: Negative.   HENT: Negative.    Eyes: Negative.    Cardiovascular: Positive for chest pain.   Respiratory: Negative.    Endocrine: Negative.    Hematologic/Lymphatic: Negative.    Skin: Negative.    Musculoskeletal: Negative.    Gastrointestinal: Negative.    Neurological: Negative.    Psychiatric/Behavioral: Negative.      Objective:   Physical Exam   Constitutional: She is oriented to person, place, and time. She appears well-developed and well-nourished. No distress.   Examination of the digits showed no clubbing or cyanosis   HENT:   Head: Normocephalic and atraumatic.   Eyes: Conjunctivae are normal. Pupils are equal, round, and reactive to light. Right eye exhibits no discharge.   Neck: Normal range of motion. Neck supple. No JVD present. No thyromegaly present.   No carotid bruits   Cardiovascular: Normal rate, regular rhythm, S1 normal, S2 normal, normal heart sounds, intact distal pulses and normal pulses.  PMI is not displaced.  Exam reveals no gallop, no friction rub and no opening snap.    No murmur heard.  Pulmonary/Chest: Effort normal and breath sounds normal. No respiratory distress. She has no wheezes. She has no rales. She exhibits no tenderness.   Abdominal: Soft. Bowel sounds are normal. She exhibits no distension and no mass. There is no tenderness. There is no guarding.   No hepatosplenomegaly   Musculoskeletal: Normal range of motion. She exhibits no edema or tenderness.   Lymphadenopathy:     She has no cervical adenopathy.   Neurological: She is  alert and oriented to person, place, and time.   Skin: Skin is warm. No rash noted. She is not diaphoretic. No erythema.   Psychiatric: She has a normal mood and affect.   Nursing note and vitals reviewed.      Lab Results   Component Value Date    WBC 5.45 10/13/2017    HGB 12.2 10/13/2017    HCT 37.2 10/13/2017    MCV 85 10/13/2017     10/13/2017         Chemistry        Component Value Date/Time     10/13/2017 0926    K 3.6 10/13/2017 0926     10/13/2017 0926    CO2 23 10/13/2017 0926    BUN 11 10/13/2017 0926    CREATININE 0.56 10/13/2017 0926    GLU 83 10/13/2017 0926        Component Value Date/Time    CALCIUM 8.5 (L) 10/13/2017 0926    ALKPHOS 65 10/13/2017 0926    AST 18 10/13/2017 0926    ALT 19 10/13/2017 0926    BILITOT 0.3 10/13/2017 0926    ESTGFRAFRICA >60.0 10/13/2017 0926    EGFRNONAA >60.0 10/13/2017 0926            Lab Results   Component Value Date    CHOL 168 04/20/2017    CHOL 220 (H) 08/26/2016     Lab Results   Component Value Date    HDL 58 04/20/2017    HDL 55 08/26/2016     Lab Results   Component Value Date    LDLCALC 92.6 04/20/2017    LDLCALC 148.8 08/26/2016     Lab Results   Component Value Date    TRIG 87 04/20/2017    TRIG 81 08/26/2016     Lab Results   Component Value Date    CHOLHDL 34.5 04/20/2017    CHOLHDL 25.0 08/26/2016       Lab Results   Component Value Date    TSH 4.320 (H) 10/13/2017       Lab Results   Component Value Date    HGBA1C 4.7 08/26/2016       ECGs reviewed-NSR  LABS reviewed      Assessment:     1. Chest pain, unspecified type    2. PADGETT (dyspnea on exertion)    3. MVP (mitral valve prolapse)    4. Bipolar affective disorder, remission status unspecified    5. Neurofibromatosis, type I (von Recklinghausen's disease)    6. Pure hypercholesterolemia        Plan:     Unable to exercise on treadmill secondary to NF type 1 involving lumbar spine.     Dobutamine stress echo complete  Continue current medications  F/u in 3 months

## 2018-04-10 DIAGNOSIS — I34.1 MVP (MITRAL VALVE PROLAPSE): Primary | ICD-10-CM

## 2018-04-16 ENCOUNTER — HOSPITAL ENCOUNTER (OUTPATIENT)
Dept: CARDIOLOGY | Facility: HOSPITAL | Age: 43
Discharge: HOME | End: 2018-04-16
Attending: INTERNAL MEDICINE
Payer: MEDICARE

## 2018-04-16 ENCOUNTER — TELEPHONE (OUTPATIENT)
Dept: CARDIOLOGY | Facility: CLINIC | Age: 43
End: 2018-04-16

## 2018-04-16 DIAGNOSIS — R06.09 DOE (DYSPNEA ON EXERTION): ICD-10-CM

## 2018-04-16 DIAGNOSIS — I34.1 MVP (MITRAL VALVE PROLAPSE): ICD-10-CM

## 2018-04-16 DIAGNOSIS — R07.9 CHEST PAIN, UNSPECIFIED TYPE: ICD-10-CM

## 2018-04-16 LAB
DIASTOLIC DYSFUNCTION: NO
MITRAL VALVE MOBILITY: NORMAL
RETIRED EF AND QEF - SEE NOTES: 55 (ref 55–65)

## 2018-04-16 PROCEDURE — 93351 STRESS TTE COMPLETE: CPT | Mod: 26,,, | Performed by: INTERNAL MEDICINE

## 2018-04-16 PROCEDURE — 93325 DOPPLER ECHO COLOR FLOW MAPG: CPT

## 2018-04-16 PROCEDURE — 93320 DOPPLER ECHO COMPLETE: CPT | Mod: 26,,, | Performed by: INTERNAL MEDICINE

## 2018-04-16 PROCEDURE — 93325 DOPPLER ECHO COLOR FLOW MAPG: CPT | Mod: 26,,, | Performed by: INTERNAL MEDICINE

## 2018-04-16 NOTE — TELEPHONE ENCOUNTER
Called and informed patient and sister that stress test is negative for coronary blockage.   Sister expressed understanding.

## 2018-04-16 NOTE — PROGRESS NOTES
1030 Pt on table, ready for test. Pt states allergic to Haldol, Prevacid, and Risperidone. Pt denies hx of glaucoma. PIV est to L wrist 22g.  1051 Connected to monitor EKG and NIBP  1100 NP ELLEN Swan at bedside to explain procedure and obtain consent. Pt verbalized full understanding and denies questions.  1102 Test started per protocol, see EKG sheet for VS. Target HR: 151  1102Dobutamine at 10mcg to increase HR, target HR _____  1105 Dobutamine at 20mcg, pt tolerating well  1108 Dobutamine at 30mcg, leg exercises and hand ball given.  1110 Target HR obtained, tech taking pics.   1112Testing phase completed, dobutamine off, NS up at rapid rate for recovery phase.  1128Recovery phase completed. Pt states feels normal, no distress, NS d/c'd, approx  250cc infused. Iv d/c'd, pt released to cardio.

## 2018-05-07 ENCOUNTER — OFFICE VISIT (OUTPATIENT)
Dept: GASTROENTEROLOGY | Facility: CLINIC | Age: 43
End: 2018-05-07
Payer: MEDICARE

## 2018-05-07 VITALS
DIASTOLIC BLOOD PRESSURE: 76 MMHG | WEIGHT: 153.25 LBS | SYSTOLIC BLOOD PRESSURE: 117 MMHG | HEIGHT: 62 IN | BODY MASS INDEX: 28.2 KG/M2

## 2018-05-07 DIAGNOSIS — R10.13 EPIGASTRIC PAIN: ICD-10-CM

## 2018-05-07 DIAGNOSIS — K21.9 GASTROESOPHAGEAL REFLUX DISEASE, ESOPHAGITIS PRESENCE NOT SPECIFIED: Primary | ICD-10-CM

## 2018-05-07 DIAGNOSIS — Q85.01 NEUROFIBROMATOSIS, TYPE I (VON RECKLINGHAUSEN'S DISEASE): ICD-10-CM

## 2018-05-07 DIAGNOSIS — F31.9 BIPOLAR AFFECTIVE DISORDER, REMISSION STATUS UNSPECIFIED: ICD-10-CM

## 2018-05-07 PROCEDURE — 99204 OFFICE O/P NEW MOD 45 MIN: CPT | Mod: S$GLB,,, | Performed by: INTERNAL MEDICINE

## 2018-05-07 PROCEDURE — 3008F BODY MASS INDEX DOCD: CPT | Mod: CPTII,S$GLB,, | Performed by: INTERNAL MEDICINE

## 2018-05-07 PROCEDURE — 99999 PR PBB SHADOW E&M-EST. PATIENT-LVL III: CPT | Mod: PBBFAC,,, | Performed by: INTERNAL MEDICINE

## 2018-05-07 NOTE — PATIENT INSTRUCTIONS
Abdominal Ultrasound  An abdominal ultrasound is an imaging test that uses sound waves to form pictures of your abdominal organs. It can help find organ problems, such as gallstones, kidney stones, or liver disease. Ultrasound does not use ionizing radiation and does not have any known risks. It can also see many blood vessels in the abdomen. If needed as part of your exam, the blood flow in these blood vessels can also be evaluated.  Before your test  · What you need to do to get ready for the test depends on the area of your body that will be looked at. Follow any directions youre given for not eating or drinking before the procedure. Your healthcare provider will give you instructions if required.  · Follow all other instructions given by your provider.  For best results, be prepared to answer questions about your medical history, including the following:  · Previous abdominal surgery  · Previous abdominal imaging tests, including ultrasound, CT, or MRI studies  During your test  · You may be asked to put on a gown.  · You will lie on an exam table with your abdomen exposed.  · A nongreasy gel will be put on your skin.  · The sonographer will use a handheld probe (transducer) against your abdomen. This probe helps create images of your abdominal organs.  · You may see the pictures of your organs on screen.  · Certain organs, like the liver, can be biopsied during an ultrasound. This will require additional steps and your provider can discuss these details with you.  The person who performs the ultrasound is called a sonographer. The sonographer can answer questions about the test. But only a doctor can explain the results.    Your test results  Your healthcare provider will discuss the test results with you during a follow-up visit or over the phone. Your next appointment is: _________________  Date Last Reviewed: 2/1/2017  © 4932-0464 [employer]. [address] [place] [address], [place] [address]. All  rights reserved. This information is not intended as a substitute for professional medical care. Always follow your healthcare professional's instructions.        GERD (Adult)    The esophagus is a tube that carries food from the mouth to the stomach. A valve at the lower end of the esophagus prevents stomach acid from flowing upward. When this valve doesn't work properly, stomach contents may repeatedly flow back up (reflux) into the esophagus. This is called gastroesophageal reflux disease (GERD). GERD can irritate the esophagus. It can cause problems with swallowing or breathing. In severe cases, GERD can cause recurrent pneumonia or other serious problems.  Symptoms of reflux include burning, pressure or sharp pain in the upper abdomen or mid to lower chest. The pain can spread to the neck, back, or shoulder. There may be belching, an acid taste in the back of the throat, chronic cough, or sore throat or hoarseness. GERD symptoms often occur during the day after a big meal. They can also occur at night when lying down.   Home care  Lifestyle changes can help reduce symptoms. If needed, medicines may be prescribed. Symptoms often improve with treatment, but if treatment is stopped, the symptoms often return after a few months. So most persons with GERD will need to continue treatment.  Lifestyle changes  · Limit or avoid fatty, fried, and spicy foods, as well as coffee, chocolate, mint, and foods with high acid content such as tomatoes and citrus fruit and juices (orange, grapefruit, lemon).  · Dont eat large meals, especially at night. Frequent, smaller meals are best. Do not lie down right after eating. And dont eat anything 3 hours before going to bed.  · Avoid drinking alcohol and smoking. As much as possible, stay away from second hand smoke.  · If you are overweight, losing weight will reduce symptoms.   · Avoid wearing tight clothing around your stomach area.  · If your symptoms occur during sleep, use a  "foam wedge to elevate your upper body (not just your head.) Or, place 4" blocks under the head of your bed.  Medicines  If needed, medicines can help relieve the symptoms of GERD and prevent damage to the esophagus. Discuss a medicine plan with your healthcare provider. This may include one or more of the following medicines:  · Antacids to help neutralize the normal acids in your stomach.  · Acid blockers (H2 blockers) to decrease acid production.  · Acid inhibitors (PPIs) to decrease acid production in a different way than the blockers. They may work better, but can take a little longer to take effect.  Take an antacid 30-60 minutes after eating and at bedtime, but not at the same time as an acid blocker.  Try not to take medicines such as ibuprofen and aspirin. If you are taking aspirin for your heart or other medical reasons, talk to your healthcare provider about stopping it.  Follow-up care  Follow up with your healthcare provider or as advised by our staff.  When to seek medical advice  Call your healthcare provider if any of the following occur:  · Stomach pain gets worse or moves to the lower right abdomen (appendix area)  · Chest pain appears or gets worse, or spreads to the back, neck, shoulder, or arm  · Frequent vomiting (cant keep down liquids)  · Blood in the stool or vomit (red or black in color)  · Feeling weak or dizzy  · Fever of 100.4ºF (38ºC) or higher, or as directed by your healthcare provider  Date Last Reviewed: 6/23/2015  © 8319-8963 [employer]. [address] [place] [address], [place] [address]. All rights reserved. This information is not intended as a substitute for professional medical care. Always follow your healthcare professional's instructions.        "

## 2018-05-07 NOTE — PROGRESS NOTES
Subjective:      Patient ID: Kenzie Saunders is a 42 y.o. female.    Chief Complaint: Abdominal Pain    HPI:   Patient 42-year-old female referred for GI evaluation.  She has prominent extensive neurofibromatosis.  She's had significant GI workups in the past but those records are normal are available.  Family members indicate she has been treated for acid reflux.  She's had prior upper and lower endoscopy greater than 7 years ago apparently without significant findings.  Prior cholecystectomy.  Main complaint today is epigastric discomfort.  Some heartburn.  Chronic postprandial urgency, intermittent.  Past medical history of hypertension, bipolar disorder.  Underwent a noninvasive cardiac evaluation for chest pain.  No evidence of ischemia.  Nonsmoker.  Nondrinker.  Family history positive for colon polyps and neurofibromatosis    Review of patient's allergies indicates:   Allergen Reactions    Haldol [haloperidol lactate] Hives    Prevacid [lansoprazole] Hives    Risperidone analogues Other (See Comments)     Elevates prolactin levels     Past Medical History:   Diagnosis Date    Astigmatism     Bipolar 2 disorder     GERD (gastroesophageal reflux disease)     Migraine headache     Neurofibromatosis, type 1 (von Recklinghausen's disease)     Sciatica      Past Surgical History:   Procedure Laterality Date    HYSTERECTOMY       Family History   Problem Relation Age of Onset    Neurofibromatosis Mother     Neurofibromatosis Maternal Aunt     Neurofibromatosis Maternal Grandfather     Neurofibromatosis Cousin      Social History     Social History    Marital status: Single     Spouse name: N/A    Number of children: N/A    Years of education: N/A     Occupational History    Not on file.     Social History Main Topics    Smoking status: Never Smoker    Smokeless tobacco: Never Used    Alcohol use No    Drug use: No    Sexual activity: No     Other Topics Concern    Not on file     Social  "History Narrative    No narrative on file       Review of Systems:  Constitutional: Negative for appetite change.  Fatigue  HENT: Negative for trouble swallowing.   Eyes: Negative for photophobia.   Respiratory: Negative for cough .  Occasional shortness of breath.   Cardiovascular: Negative for palpitations.   Gastrointestinal: See HPI for details.  Genitourinary: Negative for frequency and hematuria.   Skin: Negative for rash.   Musculoskeletal: Joint pain, low back pain  Neurological: Negative for weakness .  Frequent headaches.   Hematological: Negative.   Psychiatric/Behavioral: Negative for suicidal ideas and behavioral problems.  Anxiety, depression,  bipolar    Objective:     /76   Ht 5' 2" (1.575 m)   Wt 69.5 kg (153 lb 3.5 oz)   BMI 28.02 kg/m²     Physical Exam:  Eyes: Pupils are equal, round, and reactive to light.   Neck: Supple. No mass  Cardiovascular: Regular rhythm . No murmur   Pulmonary/Chest: Lungs clear   Abdominal: Soft. No mass palpated. Nontender, no guarding. Positive bowel sounds   Musculoskeletal: No deformity. No edema.   Psychiatric: Alert and oriented    Assessment:     1. Gastroesophageal reflux disease, esophagitis presence not specified    2. Neurofibromatosis, type I (von Recklinghausen's disease)    3. Bipolar affective disorder, remission status unspecified    4. Epigastric pain      Plan:     Kenzie was seen today for abdominal pain.    Diagnoses and all orders for this visit:    Gastroesophageal reflux disease, esophagitis presence not specified  -     ranitidine (ZANTAC) 300 MG tablet; Take 1 tablet (300 mg total) by mouth every evening.    Neurofibromatosis, type I (von Recklinghausen's disease)  -     US Abdomen Complete; Future  -     ranitidine (ZANTAC) 300 MG tablet; Take 1 tablet (300 mg total) by mouth every evening.    Bipolar affective disorder, remission status unspecified    Epigastric pain  -     US Abdomen Complete; Future  -     ranitidine (ZANTAC) 300 " MG tablet; Take 1 tablet (300 mg total) by mouth every evening.     plan:  Ranitidine trial.  Ultrasound abdomen.  EGD if no improvement

## 2018-06-26 DIAGNOSIS — E78.00 PURE HYPERCHOLESTEROLEMIA: ICD-10-CM

## 2018-06-26 DIAGNOSIS — I34.1 MVP (MITRAL VALVE PROLAPSE): ICD-10-CM

## 2018-06-26 RX ORDER — ATORVASTATIN CALCIUM 10 MG/1
10 TABLET, FILM COATED ORAL DAILY
Qty: 90 TABLET | Refills: 3 | Status: SHIPPED | OUTPATIENT
Start: 2018-06-26 | End: 2018-07-02 | Stop reason: SDUPTHER

## 2018-06-26 RX ORDER — METOPROLOL SUCCINATE 25 MG/1
25 TABLET, EXTENDED RELEASE ORAL DAILY
Qty: 90 TABLET | Refills: 3 | Status: SHIPPED | OUTPATIENT
Start: 2018-06-26 | End: 2018-07-02 | Stop reason: SDUPTHER

## 2018-07-02 ENCOUNTER — OFFICE VISIT (OUTPATIENT)
Dept: INTERNAL MEDICINE | Facility: CLINIC | Age: 43
End: 2018-07-02
Payer: MEDICARE

## 2018-07-02 VITALS
HEART RATE: 70 BPM | HEIGHT: 62 IN | DIASTOLIC BLOOD PRESSURE: 80 MMHG | TEMPERATURE: 100 F | WEIGHT: 146.63 LBS | RESPIRATION RATE: 12 BRPM | SYSTOLIC BLOOD PRESSURE: 122 MMHG | BODY MASS INDEX: 26.98 KG/M2

## 2018-07-02 DIAGNOSIS — F20.3 UNDIFFERENTIATED SCHIZOPHRENIA: ICD-10-CM

## 2018-07-02 DIAGNOSIS — E03.9 HYPOTHYROIDISM, UNSPECIFIED TYPE: ICD-10-CM

## 2018-07-02 DIAGNOSIS — Q85.01 NEUROFIBROMATOSIS, TYPE I (VON RECKLINGHAUSEN'S DISEASE): Primary | ICD-10-CM

## 2018-07-02 DIAGNOSIS — E78.00 PURE HYPERCHOLESTEROLEMIA: ICD-10-CM

## 2018-07-02 DIAGNOSIS — I34.1 MVP (MITRAL VALVE PROLAPSE): ICD-10-CM

## 2018-07-02 PROCEDURE — 3008F BODY MASS INDEX DOCD: CPT | Mod: CPTII,S$GLB,, | Performed by: INTERNAL MEDICINE

## 2018-07-02 PROCEDURE — 99999 PR PBB SHADOW E&M-EST. PATIENT-LVL III: CPT | Mod: PBBFAC,,, | Performed by: INTERNAL MEDICINE

## 2018-07-02 PROCEDURE — 99214 OFFICE O/P EST MOD 30 MIN: CPT | Mod: S$GLB,,, | Performed by: INTERNAL MEDICINE

## 2018-07-02 RX ORDER — METOPROLOL SUCCINATE 25 MG/1
25 TABLET, EXTENDED RELEASE ORAL DAILY
Qty: 90 TABLET | Refills: 3 | Status: SHIPPED | OUTPATIENT
Start: 2018-07-02 | End: 2019-08-12 | Stop reason: SDUPTHER

## 2018-07-02 RX ORDER — ATORVASTATIN CALCIUM 10 MG/1
10 TABLET, FILM COATED ORAL DAILY
Qty: 90 TABLET | Refills: 3 | Status: SHIPPED | OUTPATIENT
Start: 2018-07-02 | End: 2019-08-30 | Stop reason: SDUPTHER

## 2018-07-02 NOTE — PROGRESS NOTES
Subjective:       Patient ID: Kenzie Saunders is a 43 y.o. female.    Chief Complaint: Annual Exam; Referral (request referral to see psychiatrist Dr. Ta Sifuentes ); and Medication Refill    HPI  Checkup.  No labs off of Lipitor for 1 month.  Father  of CHF last Feb, pt had abdominal pain and anorexia, has lost 18 lbs since the 1st of the year.  No CP, palpitations, hallucinations.  Review of Systems    Objective:      Physical Exam   Constitutional: She appears well-developed. No distress.   HENT:   Head: Normocephalic.   Eyes: EOM are normal.   Neck: Normal range of motion. No tracheal deviation present.   Cardiovascular: Normal rate, regular rhythm and intact distal pulses.    Murmur (2/6 SM LSB, apex) heard.  Pulmonary/Chest: Effort normal and breath sounds normal. No respiratory distress.   Abdominal: She exhibits no distension.   Musculoskeletal: Normal range of motion. She exhibits no edema.   Neurological: She is alert. No cranial nerve deficit. She exhibits normal muscle tone. Coordination normal.   Skin: Skin is warm and dry. No rash noted. She is not diaphoretic. No erythema.   Numerous neurofibromas   Psychiatric: She has a normal mood and affect. Her behavior is normal.   Vitals reviewed.      Assessment:       1. Neurofibromatosis, type I (von Recklinghausen's disease)    2. Pure hypercholesterolemia    3. MVP (mitral valve prolapse)    4. Undifferentiated schizophrenia    5. Hypothyroidism, unspecified type        Plan:       Kenzie was seen today for annual exam, referral and medication refill.    Diagnoses and all orders for this visit:    Neurofibromatosis, type I (von Recklinghausen's disease)   Stable    Pure hypercholesterolemia  -     atorvastatin (LIPITOR) 10 MG tablet; Take 1 tablet (10 mg total) by mouth once daily.  -     Lipid panel; Future    MVP (mitral valve prolapse)  -     metoprolol succinate (TOPROL-XL) 25 MG 24 hr tablet; Take 1 tablet (25 mg total) by mouth once  daily.    Undifferentiated schizophrenia   Per psych    Hypothyroidism, unspecified type  -     TSH; Future      Follow-up in about 1 month (around 8/2/2018).

## 2018-07-30 ENCOUNTER — LAB VISIT (OUTPATIENT)
Dept: LAB | Facility: HOSPITAL | Age: 43
End: 2018-07-30
Attending: INTERNAL MEDICINE
Payer: MEDICARE

## 2018-07-30 DIAGNOSIS — E78.00 PURE HYPERCHOLESTEROLEMIA: ICD-10-CM

## 2018-07-30 DIAGNOSIS — E03.9 HYPOTHYROIDISM, UNSPECIFIED TYPE: ICD-10-CM

## 2018-07-30 LAB
CHOLEST SERPL-MCNC: 195 MG/DL
CHOLEST/HDLC SERPL: 4.6 {RATIO}
HDLC SERPL-MCNC: 42 MG/DL
HDLC SERPL: 21.5 %
LDLC SERPL CALC-MCNC: 133.4 MG/DL
NONHDLC SERPL-MCNC: 153 MG/DL
TRIGL SERPL-MCNC: 98 MG/DL
TSH SERPL DL<=0.005 MIU/L-ACNC: 1.64 UIU/ML

## 2018-07-30 PROCEDURE — 84443 ASSAY THYROID STIM HORMONE: CPT | Mod: PO

## 2018-07-30 PROCEDURE — 80061 LIPID PANEL: CPT

## 2018-07-30 PROCEDURE — 36415 COLL VENOUS BLD VENIPUNCTURE: CPT | Mod: PO

## 2018-08-09 ENCOUNTER — OFFICE VISIT (OUTPATIENT)
Dept: INTERNAL MEDICINE | Facility: CLINIC | Age: 43
End: 2018-08-09
Payer: MEDICARE

## 2018-08-09 VITALS
SYSTOLIC BLOOD PRESSURE: 134 MMHG | DIASTOLIC BLOOD PRESSURE: 86 MMHG | HEART RATE: 70 BPM | TEMPERATURE: 98 F | WEIGHT: 143.94 LBS | BODY MASS INDEX: 26.49 KG/M2 | OXYGEN SATURATION: 99 % | HEIGHT: 62 IN

## 2018-08-09 DIAGNOSIS — R73.9 HYPERGLYCEMIA: ICD-10-CM

## 2018-08-09 DIAGNOSIS — E78.00 PURE HYPERCHOLESTEROLEMIA: Primary | ICD-10-CM

## 2018-08-09 PROCEDURE — 99213 OFFICE O/P EST LOW 20 MIN: CPT | Mod: S$GLB,,, | Performed by: INTERNAL MEDICINE

## 2018-08-09 PROCEDURE — 3008F BODY MASS INDEX DOCD: CPT | Mod: CPTII,S$GLB,, | Performed by: INTERNAL MEDICINE

## 2018-08-09 PROCEDURE — 99999 PR PBB SHADOW E&M-EST. PATIENT-LVL III: CPT | Mod: PBBFAC,,, | Performed by: INTERNAL MEDICINE

## 2018-08-09 NOTE — PROGRESS NOTES
Subjective:       Patient ID: Kenzie Saunders is a 43 y.o. female.    Chief Complaint: Follow-up (blood work results)    HPI  Recheck.  Labs reviewed.  C/O HA.  No agitation or depression. No other complaints.  Unclear as to why pt is on metformin.  Review of Systems    Objective:      Physical Exam   Constitutional: She appears well-developed. No distress.   HENT:   Head: Normocephalic.   Eyes: EOM are normal.   Neck: Normal range of motion. No tracheal deviation present.   Cardiovascular: Normal rate, regular rhythm and intact distal pulses.    Murmur (2/6 SM LSB, apex) heard.  Pulmonary/Chest: Effort normal and breath sounds normal. No respiratory distress.   Abdominal: She exhibits no distension.   Musculoskeletal: Normal range of motion. She exhibits no edema.   Neurological: She is alert. No cranial nerve deficit. She exhibits normal muscle tone. Coordination normal.   Skin: Skin is warm and dry. No rash noted. She is not diaphoretic. No erythema.   Numerous neurofibromas   Psychiatric: She has a normal mood and affect. Her behavior is normal.   Vitals reviewed.      Assessment:       1. Pure hypercholesterolemia    2. Hyperglycemia        Plan:       Kenzie was seen today for follow-up.    Diagnoses and all orders for this visit:    Pure hypercholesterolemia  -     Comprehensive metabolic panel; Future  -     Lipid panel; Future    Hyperglycemia  -     Hemoglobin A1c; Future      Follow-up in about 6 months (around 2/9/2019).

## 2019-01-10 ENCOUNTER — LAB VISIT (OUTPATIENT)
Dept: LAB | Facility: HOSPITAL | Age: 44
End: 2019-01-10
Attending: PSYCHIATRY & NEUROLOGY
Payer: MEDICARE

## 2019-01-10 DIAGNOSIS — Z79.899 OTHER LONG TERM (CURRENT) DRUG THERAPY: ICD-10-CM

## 2019-01-10 DIAGNOSIS — F31.30 BIPOLAR DISORDER CURRENT EPISODE DEPRESSED: Primary | ICD-10-CM

## 2019-01-10 LAB
ALBUMIN SERPL BCP-MCNC: 4.1 G/DL
ALP SERPL-CCNC: 63 U/L
ALT SERPL W/O P-5'-P-CCNC: 18 U/L
ANION GAP SERPL CALC-SCNC: 6 MMOL/L
AST SERPL-CCNC: 23 U/L
BASOPHILS # BLD AUTO: 0.03 K/UL
BASOPHILS NFR BLD: 0.6 %
BILIRUB SERPL-MCNC: 0.5 MG/DL
BUN SERPL-MCNC: 6 MG/DL
CALCIUM SERPL-MCNC: 9.1 MG/DL
CHLORIDE SERPL-SCNC: 104 MMOL/L
CHOLEST SERPL-MCNC: 164 MG/DL
CHOLEST/HDLC SERPL: 3.2 {RATIO}
CO2 SERPL-SCNC: 28 MMOL/L
CREAT SERPL-MCNC: 0.59 MG/DL
DIFFERENTIAL METHOD: NORMAL
EOSINOPHIL # BLD AUTO: 0.3 K/UL
EOSINOPHIL NFR BLD: 5.6 %
ERYTHROCYTE [DISTWIDTH] IN BLOOD BY AUTOMATED COUNT: 12.3 %
EST. GFR  (AFRICAN AMERICAN): >60 ML/MIN/1.73 M^2
EST. GFR  (NON AFRICAN AMERICAN): >60 ML/MIN/1.73 M^2
GLUCOSE SERPL-MCNC: 91 MG/DL
HCT VFR BLD AUTO: 40.4 %
HDLC SERPL-MCNC: 51 MG/DL
HDLC SERPL: 31.1 %
HGB BLD-MCNC: 13.1 G/DL
LDLC SERPL CALC-MCNC: 98.8 MG/DL
LYMPHOCYTES # BLD AUTO: 1.8 K/UL
LYMPHOCYTES NFR BLD: 36.7 %
MCH RBC QN AUTO: 27.6 PG
MCHC RBC AUTO-ENTMCNC: 32.4 G/DL
MCV RBC AUTO: 85 FL
MONOCYTES # BLD AUTO: 0.5 K/UL
MONOCYTES NFR BLD: 10.6 %
NEUTROPHILS # BLD AUTO: 2.2 K/UL
NEUTROPHILS NFR BLD: 46.3 %
NONHDLC SERPL-MCNC: 113 MG/DL
PLATELET # BLD AUTO: 259 K/UL
PMV BLD AUTO: 10.3 FL
POTASSIUM SERPL-SCNC: 4.4 MMOL/L
PROT SERPL-MCNC: 7.4 G/DL
RBC # BLD AUTO: 4.75 M/UL
SODIUM SERPL-SCNC: 138 MMOL/L
T4 FREE SERPL-MCNC: 0.9 NG/DL
TRIGL SERPL-MCNC: 71 MG/DL
TSH SERPL DL<=0.005 MIU/L-ACNC: 1.73 UIU/ML
WBC # BLD AUTO: 4.79 K/UL

## 2019-01-10 PROCEDURE — 85025 COMPLETE CBC W/AUTO DIFF WBC: CPT | Mod: PO,ER

## 2019-01-10 PROCEDURE — 84443 ASSAY THYROID STIM HORMONE: CPT | Mod: PO,ER

## 2019-01-10 PROCEDURE — 80053 COMPREHEN METABOLIC PANEL: CPT | Mod: PO,ER

## 2019-01-10 PROCEDURE — 36415 COLL VENOUS BLD VENIPUNCTURE: CPT | Mod: PO,ER

## 2019-01-10 PROCEDURE — 84439 ASSAY OF FREE THYROXINE: CPT

## 2019-01-10 PROCEDURE — 80061 LIPID PANEL: CPT

## 2019-02-11 ENCOUNTER — OFFICE VISIT (OUTPATIENT)
Dept: INTERNAL MEDICINE | Facility: CLINIC | Age: 44
End: 2019-02-11
Payer: MEDICARE

## 2019-02-11 VITALS
BODY MASS INDEX: 25.76 KG/M2 | SYSTOLIC BLOOD PRESSURE: 116 MMHG | HEIGHT: 62 IN | HEART RATE: 64 BPM | DIASTOLIC BLOOD PRESSURE: 86 MMHG | OXYGEN SATURATION: 94 % | WEIGHT: 140 LBS

## 2019-02-11 DIAGNOSIS — E78.00 PURE HYPERCHOLESTEROLEMIA: ICD-10-CM

## 2019-02-11 DIAGNOSIS — F31.9 BIPOLAR AFFECTIVE DISORDER, REMISSION STATUS UNSPECIFIED: ICD-10-CM

## 2019-02-11 DIAGNOSIS — R10.13 EPIGASTRIC PAIN: ICD-10-CM

## 2019-02-11 DIAGNOSIS — F20.3 UNDIFFERENTIATED SCHIZOPHRENIA: ICD-10-CM

## 2019-02-11 DIAGNOSIS — Q85.01 NEUROFIBROMATOSIS, TYPE I (VON RECKLINGHAUSEN'S DISEASE): ICD-10-CM

## 2019-02-11 DIAGNOSIS — Z00.00 ROUTINE GENERAL MEDICAL EXAMINATION AT A HEALTH CARE FACILITY: Primary | ICD-10-CM

## 2019-02-11 PROCEDURE — 99999 PR PBB SHADOW E&M-EST. PATIENT-LVL III: ICD-10-PCS | Mod: PBBFAC,HCNC,, | Performed by: INTERNAL MEDICINE

## 2019-02-11 PROCEDURE — G0008 ADMIN INFLUENZA VIRUS VAC: HCPCS | Mod: HCNC,S$GLB,, | Performed by: INTERNAL MEDICINE

## 2019-02-11 PROCEDURE — 99396 PR PREVENTIVE VISIT,EST,40-64: ICD-10-PCS | Mod: 25,HCNC,S$GLB, | Performed by: INTERNAL MEDICINE

## 2019-02-11 PROCEDURE — 90686 IIV4 VACC NO PRSV 0.5 ML IM: CPT | Mod: HCNC,S$GLB,, | Performed by: INTERNAL MEDICINE

## 2019-02-11 PROCEDURE — 99999 PR PBB SHADOW E&M-EST. PATIENT-LVL III: CPT | Mod: PBBFAC,HCNC,, | Performed by: INTERNAL MEDICINE

## 2019-02-11 PROCEDURE — 90686 FLU VACCINE (QUAD) GREATER THAN OR EQUAL TO 3YO PRESERVATIVE FREE IM: ICD-10-PCS | Mod: HCNC,S$GLB,, | Performed by: INTERNAL MEDICINE

## 2019-02-11 PROCEDURE — G0008 FLU VACCINE (QUAD) GREATER THAN OR EQUAL TO 3YO PRESERVATIVE FREE IM: ICD-10-PCS | Mod: HCNC,S$GLB,, | Performed by: INTERNAL MEDICINE

## 2019-02-11 PROCEDURE — 99396 PREV VISIT EST AGE 40-64: CPT | Mod: 25,HCNC,S$GLB, | Performed by: INTERNAL MEDICINE

## 2019-02-11 RX ORDER — OMEPRAZOLE 20 MG/1
20 CAPSULE, DELAYED RELEASE ORAL DAILY
Qty: 90 CAPSULE | Refills: 3 | Status: SHIPPED | OUTPATIENT
Start: 2019-02-11 | End: 2020-11-03

## 2019-02-11 NOTE — PROGRESS NOTES
Subjective:       Patient ID: Kenzie Saunders is a 43 y.o. female.    Chief Complaint: Follow-up    [facility]  Wellness check.  C/O postprandial abd pain, anorexia.  Weight down 4 lbs/ 6 mo.  No mean/BRBPR.  No anxiety, depression, agitation.  Chart reviewed.  Review of Systems   All other systems reviewed and are negative.      Objective:      Physical Exam   Constitutional: She appears well-developed. No distress.   HENT:   Head: Normocephalic.   Eyes: EOM are normal.   Neck: Normal range of motion. No tracheal deviation present.   Cardiovascular: Normal rate, regular rhythm, normal heart sounds and intact distal pulses.   Pulmonary/Chest: Effort normal and breath sounds normal. No respiratory distress.   Abdominal: Soft. Bowel sounds are normal. She exhibits no distension. There is tenderness (epigastric).   Musculoskeletal: Normal range of motion. She exhibits no edema.   Neurological: She is alert. No cranial nerve deficit. She exhibits normal muscle tone. Coordination normal.   Skin: Skin is warm and dry. No rash noted. She is not diaphoretic. No erythema.   neurofibromas   Psychiatric: She has a normal mood and affect. Her behavior is normal.   Vitals reviewed.      Assessment:       1. Routine general medical examination at a health care facility    2. Epigastric pain    3. Pure hypercholesterolemia    4. Neurofibromatosis, type I (von Recklinghausen's disease)    5. Undifferentiated schizophrenia    6. Bipolar affective disorder, remission status unspecified        Plan:       Kenzie was seen today for follow-up.    Diagnoses and all orders for this visit:    Routine general medical examination at a health care facility  -     Influenza - Quadrivalent (3 years & older) (PF)    Epigastric pain  -     omeprazole (PRILOSEC) 20 MG capsule; Take 1 capsule (20 mg total) by mouth once daily.    Pure hypercholesterolemia   Well-cont    Neurofibromatosis, type I (von Recklinghausen's  disease)   Monitor    Undifferentiated schizophrenia   In remission    Bipolar affective disorder, remission status unspecified   Well-cont      Follow-up if symptoms worsen or fail to improve.

## 2019-05-17 DIAGNOSIS — Z12.39 BREAST CANCER SCREENING: ICD-10-CM

## 2019-08-12 DIAGNOSIS — I34.1 MVP (MITRAL VALVE PROLAPSE): ICD-10-CM

## 2019-08-12 RX ORDER — METOPROLOL SUCCINATE 25 MG/1
25 TABLET, EXTENDED RELEASE ORAL DAILY
Qty: 90 TABLET | Refills: 4 | Status: SHIPPED | OUTPATIENT
Start: 2019-08-12 | End: 2020-04-22 | Stop reason: SDUPTHER

## 2019-08-30 DIAGNOSIS — E78.00 PURE HYPERCHOLESTEROLEMIA: ICD-10-CM

## 2019-08-30 RX ORDER — ATORVASTATIN CALCIUM 10 MG/1
10 TABLET, FILM COATED ORAL DAILY
Qty: 90 TABLET | Refills: 4 | Status: SHIPPED | OUTPATIENT
Start: 2019-08-30 | End: 2020-04-22 | Stop reason: SDUPTHER

## 2020-04-22 DIAGNOSIS — E78.00 PURE HYPERCHOLESTEROLEMIA: ICD-10-CM

## 2020-04-22 DIAGNOSIS — I34.1 MVP (MITRAL VALVE PROLAPSE): ICD-10-CM

## 2020-04-22 RX ORDER — METOPROLOL SUCCINATE 25 MG/1
25 TABLET, EXTENDED RELEASE ORAL DAILY
Qty: 90 TABLET | Refills: 4 | Status: SHIPPED | OUTPATIENT
Start: 2020-04-22 | End: 2022-04-12

## 2020-04-22 RX ORDER — ATORVASTATIN CALCIUM 10 MG/1
10 TABLET, FILM COATED ORAL DAILY
Qty: 90 TABLET | Refills: 4 | Status: SHIPPED | OUTPATIENT
Start: 2020-04-22 | End: 2020-11-03 | Stop reason: SDUPTHER

## 2020-04-22 NOTE — TELEPHONE ENCOUNTER
----- Message from Guido Barker sent at 4/22/2020  8:20 AM CDT -----  Contact: rx   Pt need new 90 day script called in to [facility] mail order [facility]   atorvastatin (LIPITOR) 10 MG tablet    metoprolol succinate (TOPROL-XL) 25 MG 24 hr tablet      [facility] can be reached at [phone]

## 2020-06-30 ENCOUNTER — LAB VISIT (OUTPATIENT)
Dept: LAB | Facility: HOSPITAL | Age: 45
End: 2020-06-30
Attending: PSYCHIATRY & NEUROLOGY
Payer: MEDICARE

## 2020-06-30 DIAGNOSIS — F31.12 BIPOLAR DISORDER, CURRENT EPISODE MANIC WITHOUT PSYCHOTIC FEATURES, MODERATE: ICD-10-CM

## 2020-06-30 DIAGNOSIS — Z79.899 OTHER LONG TERM (CURRENT) DRUG THERAPY: ICD-10-CM

## 2020-06-30 LAB
ALBUMIN SERPL BCP-MCNC: 4.2 G/DL (ref 3.5–5.2)
ALP SERPL-CCNC: 68 U/L (ref 38–126)
ALT SERPL W/O P-5'-P-CCNC: 12 U/L (ref 10–44)
ANION GAP SERPL CALC-SCNC: 12 MMOL/L (ref 8–16)
AST SERPL-CCNC: 21 U/L (ref 15–46)
BASOPHILS # BLD AUTO: 0.04 K/UL (ref 0–0.2)
BASOPHILS NFR BLD: 0.8 % (ref 0–1.9)
BILIRUB SERPL-MCNC: 0.6 MG/DL (ref 0.1–1)
BUN SERPL-MCNC: 8 MG/DL (ref 7–17)
CALCIUM SERPL-MCNC: 9 MG/DL (ref 8.7–10.5)
CHLORIDE SERPL-SCNC: 106 MMOL/L (ref 95–110)
CHOLEST SERPL-MCNC: 182 MG/DL (ref 120–199)
CHOLEST/HDLC SERPL: 3.3 {RATIO} (ref 2–5)
CO2 SERPL-SCNC: 22 MMOL/L (ref 23–29)
CREAT SERPL-MCNC: 0.53 MG/DL (ref 0.5–1.4)
DIFFERENTIAL METHOD: ABNORMAL
EOSINOPHIL # BLD AUTO: 0.1 K/UL (ref 0–0.5)
EOSINOPHIL NFR BLD: 2 % (ref 0–8)
ERYTHROCYTE [DISTWIDTH] IN BLOOD BY AUTOMATED COUNT: 11.7 % (ref 11.5–14.5)
EST. GFR  (AFRICAN AMERICAN): >60 ML/MIN/1.73 M^2
EST. GFR  (NON AFRICAN AMERICAN): >60 ML/MIN/1.73 M^2
ESTIMATED AVG GLUCOSE: 94 MG/DL (ref 68–131)
GLUCOSE SERPL-MCNC: 96 MG/DL (ref 70–110)
HBA1C MFR BLD HPLC: 4.9 % (ref 4–5.6)
HCT VFR BLD AUTO: 39.7 % (ref 37–48.5)
HDLC SERPL-MCNC: 56 MG/DL (ref 40–75)
HDLC SERPL: 30.8 % (ref 20–50)
HGB BLD-MCNC: 13 G/DL (ref 12–16)
IMM GRANULOCYTES # BLD AUTO: 0.03 K/UL (ref 0–0.04)
IMM GRANULOCYTES NFR BLD AUTO: 0.6 % (ref 0–0.5)
LDLC SERPL CALC-MCNC: 110 MG/DL (ref 63–159)
LYMPHOCYTES # BLD AUTO: 1.9 K/UL (ref 1–4.8)
LYMPHOCYTES NFR BLD: 37.1 % (ref 18–48)
MCH RBC QN AUTO: 28.2 PG (ref 27–31)
MCHC RBC AUTO-ENTMCNC: 32.7 G/DL (ref 32–36)
MCV RBC AUTO: 86 FL (ref 82–98)
MONOCYTES # BLD AUTO: 0.6 K/UL (ref 0.3–1)
MONOCYTES NFR BLD: 11.6 % (ref 4–15)
NEUTROPHILS # BLD AUTO: 2.4 K/UL (ref 1.8–7.7)
NEUTROPHILS NFR BLD: 47.9 % (ref 38–73)
NONHDLC SERPL-MCNC: 126 MG/DL
NRBC BLD-RTO: 0 /100 WBC
PLATELET # BLD AUTO: 310 K/UL (ref 150–350)
PMV BLD AUTO: 10.1 FL (ref 9.2–12.9)
POTASSIUM SERPL-SCNC: 4.3 MMOL/L (ref 3.5–5.1)
PROT SERPL-MCNC: 7.8 G/DL (ref 6–8.4)
RBC # BLD AUTO: 4.61 M/UL (ref 4–5.4)
SODIUM SERPL-SCNC: 140 MMOL/L (ref 136–145)
T4 FREE SERPL-MCNC: 0.81 NG/DL (ref 0.71–1.51)
TRIGL SERPL-MCNC: 80 MG/DL (ref 30–150)
TSH SERPL DL<=0.005 MIU/L-ACNC: 1.2 UIU/ML (ref 0.4–4)
WBC # BLD AUTO: 5.09 K/UL (ref 3.9–12.7)

## 2020-06-30 PROCEDURE — 84439 ASSAY OF FREE THYROXINE: CPT | Mod: HCNC

## 2020-06-30 PROCEDURE — 80053 COMPREHEN METABOLIC PANEL: CPT | Mod: HCNC,PO

## 2020-06-30 PROCEDURE — 80061 LIPID PANEL: CPT | Mod: HCNC

## 2020-06-30 PROCEDURE — 85025 COMPLETE CBC W/AUTO DIFF WBC: CPT | Mod: HCNC,PO

## 2020-06-30 PROCEDURE — 83036 HEMOGLOBIN GLYCOSYLATED A1C: CPT | Mod: HCNC

## 2020-06-30 PROCEDURE — 36415 COLL VENOUS BLD VENIPUNCTURE: CPT | Mod: HCNC,PO

## 2020-06-30 PROCEDURE — 84443 ASSAY THYROID STIM HORMONE: CPT | Mod: HCNC,PO

## 2020-11-03 ENCOUNTER — OFFICE VISIT (OUTPATIENT)
Dept: CARDIOLOGY | Facility: CLINIC | Age: 45
End: 2020-11-03
Payer: MEDICARE

## 2020-11-03 VITALS
DIASTOLIC BLOOD PRESSURE: 86 MMHG | OXYGEN SATURATION: 100 % | BODY MASS INDEX: 26.57 KG/M2 | HEART RATE: 78 BPM | SYSTOLIC BLOOD PRESSURE: 138 MMHG | HEIGHT: 62 IN | WEIGHT: 144.38 LBS

## 2020-11-03 DIAGNOSIS — E66.3 OVERWEIGHT (BMI 25.0-29.9): ICD-10-CM

## 2020-11-03 DIAGNOSIS — I10 ESSENTIAL HYPERTENSION: ICD-10-CM

## 2020-11-03 DIAGNOSIS — E78.00 PURE HYPERCHOLESTEROLEMIA: ICD-10-CM

## 2020-11-03 DIAGNOSIS — Z82.49 FAMILY HISTORY OF HEART DISEASE: ICD-10-CM

## 2020-11-03 DIAGNOSIS — R06.02 SOB (SHORTNESS OF BREATH): ICD-10-CM

## 2020-11-03 DIAGNOSIS — R00.2 PALPITATIONS: ICD-10-CM

## 2020-11-03 DIAGNOSIS — R07.9 CHEST PAIN AT REST: Primary | ICD-10-CM

## 2020-11-03 DIAGNOSIS — R45.89 ANXIETY ABOUT HEALTH: ICD-10-CM

## 2020-11-03 DIAGNOSIS — E78.5 DYSLIPIDEMIA: ICD-10-CM

## 2020-11-03 PROBLEM — I34.1 MVP (MITRAL VALVE PROLAPSE): Status: RESOLVED | Noted: 2017-03-27 | Resolved: 2020-11-03

## 2020-11-03 PROBLEM — R06.09 DOE (DYSPNEA ON EXERTION): Status: ACTIVE | Noted: 2020-11-03

## 2020-11-03 PROCEDURE — 99999 PR PBB SHADOW E&M-EST. PATIENT-LVL IV: ICD-10-PCS | Mod: PBBFAC,,, | Performed by: NURSE PRACTITIONER

## 2020-11-03 PROCEDURE — 99999 PR PBB SHADOW E&M-EST. PATIENT-LVL IV: CPT | Mod: PBBFAC,,, | Performed by: NURSE PRACTITIONER

## 2020-11-03 PROCEDURE — 99214 OFFICE O/P EST MOD 30 MIN: CPT | Mod: S$GLB,,, | Performed by: NURSE PRACTITIONER

## 2020-11-03 PROCEDURE — 99214 PR OFFICE/OUTPT VISIT, EST, LEVL IV, 30-39 MIN: ICD-10-PCS | Mod: S$GLB,,, | Performed by: NURSE PRACTITIONER

## 2020-11-03 RX ORDER — CARIPRAZINE 3 MG/1
CAPSULE, GELATIN COATED ORAL EVERY MORNING
COMMUNITY
Start: 2020-10-05 | End: 2021-03-05

## 2020-11-03 RX ORDER — TRAZODONE HYDROCHLORIDE 100 MG/1
100 TABLET ORAL NIGHTLY
COMMUNITY
Start: 2020-10-05 | End: 2023-09-08

## 2020-11-03 RX ORDER — ATORVASTATIN CALCIUM 10 MG/1
10 TABLET, FILM COATED ORAL DAILY
Qty: 30 TABLET | Refills: 11 | Status: SHIPPED | OUTPATIENT
Start: 2020-11-03 | End: 2021-12-23

## 2020-11-03 RX ORDER — AMLODIPINE BESYLATE 5 MG/1
5 TABLET ORAL DAILY
Qty: 30 TABLET | Refills: 11 | Status: SHIPPED | OUTPATIENT
Start: 2020-11-03 | End: 2021-12-23

## 2020-11-03 NOTE — PATIENT INSTRUCTIONS
Graded exercise for 30 minutes a day for at least 5 days a week or a total of 150 minutes a week.  You can include walkng, stationary bike, or swim for 30 minutes .  Whatever exercise you choose, make sure you are working hard enough to increase your heart rate.              Low-Salt Diet  This diet removes foods that are high in salt. It also limits the amount of salt you use when cooking. It is most often used for people with high blood pressure, edema (fluid retention), and kidney, liver, or heart disease.  Table salt contains the mineral sodium. Your body needs sodium to work normally. But too much sodium can make your health problems worse. Your healthcare provider is recommending a low-salt (also called low-sodium) diet for you. Your total daily allowance of salt is 1,500 to 2,300 milligrams (mg). It is less than 1 teaspoon of table salt. This means you can have only about 500 to 700 mg of sodium at each meal. People with certain health problems should limit salt intake to the lower end of the recommended range.    When you cook, dont add much salt. If you can cook without using salt, even better. Dont add salt to your food at the table.  When shopping, read food labels. Salt is often called sodium on the label. Choose foods that are salt-free, low salt, or very low salt. Note that foods with reduced salt may not lower your salt intake enough.    Beans, potatoes, and pasta  Ok: Dry beans, split peas, lentils, potatoes, rice, macaroni, pasta, spaghetti without added salt  Avoid: Potato chips, tortilla chips, and similar products  Breads and cereals  Ok: Low-sodium breads, rolls, cereals, and cakes; low-salt crackers, matzo crackers  Avoid: Salted crackers, pretzels, popcorn, Yi toast, pancakes, muffins  Dairy  Ok: Milk, chocolate milk, hot chocolate mix, low-salt cheeses, and yogurt  Avoid: Processed cheese and cheese spreads; Roquefort, Camembert, and cottage cheese; buttermilk, instant breakfast  drink  Desserts  Ok: Ice cream, frozen yogurt, juice bars, gelatin, cookies and pies, sugar, honey, jelly, hard candy  Avoid: Most pies, cakes and cookies prepared or processed with salt; instant pudding  Drinks  Ok: Tea, coffee, fizzy (carbonated) drinks, juices  Avoid: Flavored coffees, electrolyte replacement drinks, sports drinks  Meats  Ok: All fresh meat, fish, poultry, low-salt tuna, eggs, egg substitute  Avoid: Smoked, pickled, brine-cured, or salted meats and fish. This includes parker, chipped beef, corned beef, hot dogs, deli meats, ham, kosher meats, salt pork, sausage, canned tuna, salted codfish, smoked salmon, herring, sardines, or anchovies.  Seasonings and spices  Ok: Most seasonings are okay. Good substitutes for salt include: fresh herb blends, hot sauce, lemon, garlic, farfan, vinegar, dry mustard, parsley, cilantro, horseradish, tomato paste, regular margarine, mayonnaise, unsalted butter, cream cheese, vegetable oil, cream, low-salt salad dressing and gravy.  Avoid: Regular ketchup, relishes, pickles, soy sauce, teriyaki sauce, Worcestershire sauce, BBQ sauce, tartar sauce, meat tenderizer, chili sauce, regular gravy, regular salad dressing, salted butter  Soups  Ok: Low-salt soups and broths made with allowed foods  Avoid: Bouillon cubes, soups with smoked or salted meats, regular soup and broth  Vegetables  Ok: Most vegetables are okay; also low-salt tomato and vegetable juices  Avoid: Sauerkraut and other brine-soaked vegetables; pickles and other pickled vegetables; tomato juice, olives  Date Last Reviewed: 8/1/2016  © 0246-2604 [employer]. [address] [place] [address], [place] [address]. All rights reserved. This information is not intended as a substitute for professional medical care. Always follow your healthcare professional's instructions.

## 2020-11-03 NOTE — PROGRESS NOTES
"    Subjective:   Chief Complaint: Mitral Valve Prolapse      Problem List:   HLD  neurofibromatosis -von Recklinghausen's disease  H/o CP with associated PADGETT  H/o palpitations      History of Present Illness: Kenzie Saunders is a 45 y.o. AA female patient who presents for c/o palpitations and PADGETT.  She has history of palpitations in which she takes metoprolol.  For the past month though, she feels symptoms daily.  Symptoms are nonexertional.  When she begins talking fast or becomes upset, her heart starts racing with SOB and sharp pain starting at her left Shoulder to left breast pain feeling sharp all lasting one minute.  She reports having history of panic attacks with last one occurring 2 weeks ago.  She is also having family stressors over the past month and feels more anxiety.  She is anxious about her health because she was told years ago she had MVP and her father passed of heart disease.  She is not routinely exercising but reports babysitting her 3 year old nephew that keeps her active when she plays with him.  Her diet is "junk food." She admits to not watching her salt intake.  Weight has been stable.  She is a nonsmoker and no ETOH use.  She denies any cough, orthopnea, peripheral edema, palpitations, syncope or claudication.  She occasionally checks her BP at home and reports it to be in the 140/90 range.    4/2018-dobutamine stress test negative for ischemia, (EF 55-60%, mitral valve normal)    Family history of CAD and heart disease; father passed away 2018-CHF and CAD    Review of Systems   Constitution: Positive for decreased appetite. Negative for chills, diaphoresis, fever, malaise/fatigue, night sweats, weight gain and weight loss.        Denies change in activity level   HENT: Negative for nosebleeds.    Eyes: Negative for vision loss in left eye and vision loss in right eye.        No amaurosis fugax   Cardiovascular: Positive for chest pain, dyspnea on exertion, irregular heartbeat and " palpitations. Negative for claudication, cyanosis, leg swelling, near-syncope, orthopnea, paroxysmal nocturnal dyspnea and syncope.        As per HPI above   Respiratory: Negative for cough, hemoptysis, sleep disturbances due to breathing, snoring and wheezing.         Denies SHOAIB symptoms, sleeps on one pillow   Hematologic/Lymphatic: Negative for bleeding problem.   Skin: Negative for rash.        neurofibromatosis   Musculoskeletal: Positive for back pain (lumber spine-tumor-neurofibromatosis), falls ([id number], leg leg gave out) and muscle weakness (left leg). Negative for gout, muscle cramps, neck pain and stiffness.   Gastrointestinal: Negative for abdominal pain, constipation, diarrhea, hematemesis, hematochezia, melena, nausea and vomiting.        GERD on OTC prilosec prn    Genitourinary: Negative for dysuria and hematuria.        Hysterectomy   Neurological: Positive for light-headedness (when standing up too fast). Negative for excessive daytime sleepiness, loss of balance, vertigo and weakness.        Sciatica   Psychiatric/Behavioral: Negative for altered mental status. The patient is nervous/anxious.         Bipolar disorder   Allergic/Immunologic:        Drug allergies listed elsewhere if present       Social History:  Kenzie reports that she has never smoked. She has never used smokeless tobacco. She reports that she does not drink alcohol or use drugs.      The 10-year ASCVD risk score (Savannah SHANNAN Jr., et al., 2013) is: 1.4%    Values used to calculate the score:      Age: 45 years      Sex: Female      Is Non- : Yes      Diabetic: No      Tobacco smoker: No      Systolic Blood Pressure: 138 mmHg      Is BP treated: No      HDL Cholesterol: 56 mg/dL      Total Cholesterol: 182 mg/dL       Medications:  Outpatient Encounter Medications as of 11/3/2020   Medication Sig Dispense Refill    atorvastatin (LIPITOR) 10 MG tablet Take 1 tablet (10 mg total) by mouth once daily.  "90 tablet 4    escitalopram oxalate (LEXAPRO ORAL) Take by mouth.      metoprolol succinate (TOPROL-XL) 25 MG 24 hr tablet Take 1 tablet (25 mg total) by mouth once daily. 90 tablet 4    omeprazole (PRILOSEC) 20 MG capsule Take 1 capsule (20 mg total) by mouth once daily. 90 capsule 3     No facility-administered encounter medications on file as of 11/3/2020.      Family History:    Kenzie's family history includes Neurofibromatosis in her cousin, maternal aunt, maternal grandfather, and mother.    Objective:          /86 (BP Location: Left arm, Patient Position: Sitting, BP Method: Medium (Automatic))   Pulse 78   Ht 5' 2" (1.575 m)   Wt 65.5 kg (144 lb 6.4 oz)   SpO2 100%   BMI 26.41 kg/m²       Physical Exam  Vitals signs reviewed.   Constitutional:       General: She is not in acute distress.     Appearance: Normal appearance. She is well-developed. She is not ill-appearing, toxic-appearing or diaphoretic.   HENT:      Head: Normocephalic and atraumatic.   Eyes:      General: No scleral icterus.     Extraocular Movements: Extraocular movements intact.      Conjunctiva/sclera: Conjunctivae normal.      Pupils: Pupils are equal, round, and reactive to light.   Neck:      Musculoskeletal: Normal range of motion and neck supple. No muscular tenderness.      Thyroid: No thyromegaly.      Vascular: No carotid bruit or JVD.      Trachea: No tracheal deviation.   Cardiovascular:      Rate and Rhythm: Normal rate and regular rhythm.      Chest Wall: PMI is not displaced. No thrill.      Pulses:           Carotid pulses are 2+ on the right side and 2+ on the left side.       Radial pulses are 2+ on the right side and 2+ on the left side.        Dorsalis pedis pulses are 2+ on the right side and 2+ on the left side.        Posterior tibial pulses are 1+ on the right side and 1+ on the left side.      Heart sounds: S1 normal and S2 normal. No murmur. No friction rub. No gallop.    Pulmonary:      Effort: " Pulmonary effort is normal. No respiratory distress.      Breath sounds: Normal breath sounds. No stridor. No wheezing, rhonchi or rales.   Chest:      Chest wall: No tenderness.   Abdominal:      General: Bowel sounds are normal. There is no distension or abdominal bruit.      Palpations: Abdomen is soft.      Tenderness: There is no abdominal tenderness. There is no guarding.   Musculoskeletal: Normal range of motion.      Right lower leg: No edema.      Left lower leg: No edema.   Lymphadenopathy:      Cervical: No cervical adenopathy.   Skin:     General: Skin is warm and dry.      Comments: +neurofibromas throughout trunk    Neurological:      Mental Status: She is alert and oriented to person, place, and time.      Gait: Gait normal.   Psychiatric:         Attention and Perception: Attention normal.         Mood and Affect: Mood is anxious.         Speech: Speech is rapid and pressured.         Behavior: Behavior normal. Behavior is cooperative.         Thought Content: Thought content normal.         Cognition and Memory: Cognition normal.         Judgment: Judgment normal.         Lab Results   Component Value Date     06/30/2020    K 4.3 06/30/2020     06/30/2020    CO2 22 (L) 06/30/2020    BUN 8 06/30/2020    CREATININE 0.53 06/30/2020    GLU 96 06/30/2020    HGBA1C 4.9 06/30/2020    AST 21 06/30/2020    ALT 12 06/30/2020    ALKPHOS 68 06/30/2020    ALBUMIN 4.2 06/30/2020    PROT 7.8 06/30/2020    BILITOT 0.6 06/30/2020    WBC 5.09 06/30/2020    HGB 13.0 06/30/2020    HCT 39.7 06/30/2020    MCV 86 06/30/2020     06/30/2020    TSH 1.200 06/30/2020    CHOL 182 06/30/2020    HDL 56 06/30/2020    LDLCALC 110.0 06/30/2020    TRIG 80 06/30/2020         Reviewed:   [x]  Stress test   []  Angiography   []  Echocardiogram   [x]  Labs   [x]  Other:  Old records       Assessment/Plan:     Kenzie Saunders was seen today for palpitations,SOB and CP.     Chest pain at rest--nonexertional, atypical  for cardiac origin or for cardiac ischemia, low ASCVD risk score  Palpitations-chronic intermittent, TSH WNL  SOB (shortness of breath)  -     Holter monitor - 24 hour; Future  -discussed stress reduction, reassurance provided that symptoms are not angina    Anxiety about health  -reviewed her negative stress test findings and discussed that she does not have MVP    Dyslipidemia-stable  -     atorvastatin (LIPITOR) 10 MG tablet; Take 1 tablet (10 mg total) by mouth once daily.  Dispense: 30 tablet; Refill: 11  -Emphasized Mediterranean diet  -Recommended regular exercise      Essential hypertension-new diagnosis  -     amLODIPine (NORVASC) 5 MG tablet; Take 1 tablet (5 mg total) by mouth once daily.  Dispense: 30 tablet; Refill: 11  -BP goal <130/80   -Encouraged Low Na consumption-DASH diet  -Regular exercise regimen at least 30minutes 5 days a week-she has a stationary bike in her home to use   -Encouraged home BP monitoring using log sheet provided and report if >130/80   -Not eligible for Digital HTN program      Family history of heart disease  We spent 35 minutes together face-to-face with the patient, over half in discussion of the diagnosis, counseling and health education on primary and secondary disease prevention, counseling and recommended treatment plan.    Overweight-stable  -diet per above, low carbohydrates  -exercise per above    This patient was discussed with YANETH Snyder, FNP-BC   Cardiology Consult         Unless there are intervening problems, patient should return for re-evaluation in Follow up in about 2 months (around 1/3/2021).

## 2020-11-10 ENCOUNTER — PES CALL (OUTPATIENT)
Dept: ADMINISTRATIVE | Facility: CLINIC | Age: 45
End: 2020-11-10

## 2020-11-19 ENCOUNTER — TELEPHONE (OUTPATIENT)
Dept: CARDIOLOGY | Facility: CLINIC | Age: 45
End: 2020-11-19

## 2020-11-19 DIAGNOSIS — R07.9 CHEST PAIN AT REST: ICD-10-CM

## 2020-11-19 DIAGNOSIS — R00.2 PALPITATIONS: Primary | ICD-10-CM

## 2020-11-19 NOTE — TELEPHONE ENCOUNTER
I called Ms. Saunders she did not answer I left a message to call the office inference to her question about the Holter monitor. Ms. Granderson called back she want to know if she can get the 48 hour Holter instead of the 24 hour Holter monitor message went to Ms. Ronald Gutierrez       ----- Message from Tamar Douglas MA sent at 11/19/2020  9:27 AM CST -----  The patient would like to talk to you about her Holter that schedule she have some question. Please call [phone]. Thank you.

## 2020-11-20 NOTE — PROGRESS NOTES
Ms. Brianne Saunders is schedule to have a 24 hour Holter Monitor put on tomorrow but she said her family would like her to get a 48 hour Holter Monitor she would like you to change the order.  Changing to 48hour monitor will ensure that patient is wearing monitor when she feels palpitations.

## 2020-11-23 ENCOUNTER — HOSPITAL ENCOUNTER (OUTPATIENT)
Dept: CARDIOLOGY | Facility: HOSPITAL | Age: 45
Discharge: HOME | End: 2020-11-23
Attending: NURSE PRACTITIONER
Payer: MEDICARE

## 2020-11-23 DIAGNOSIS — R07.9 CHEST PAIN AT REST: ICD-10-CM

## 2020-11-23 DIAGNOSIS — R00.2 PALPITATIONS: ICD-10-CM

## 2020-11-23 PROCEDURE — 93227 XTRNL ECG REC<48 HR R&I: CPT | Mod: ,,, | Performed by: INTERNAL MEDICINE

## 2020-11-23 PROCEDURE — 93225 XTRNL ECG REC<48 HRS REC: CPT | Mod: PO

## 2020-11-23 PROCEDURE — 93227 HOLTER MONITOR - 48 HOUR (CUPID ONLY): ICD-10-PCS | Mod: ,,, | Performed by: INTERNAL MEDICINE

## 2020-12-02 ENCOUNTER — PES CALL (OUTPATIENT)
Dept: ADMINISTRATIVE | Facility: CLINIC | Age: 45
End: 2020-12-02

## 2020-12-02 LAB
OHS CV EVENT MONITOR DAY: 0
OHS CV HOLTER LENGTH DECIMAL HOURS: 25
OHS CV HOLTER LENGTH HOURS: 25
OHS CV HOLTER LENGTH MINUTES: 0

## 2021-02-08 ENCOUNTER — TELEPHONE (OUTPATIENT)
Dept: NEUROLOGY | Facility: CLINIC | Age: 46
End: 2021-02-08

## 2021-02-24 ENCOUNTER — LAB VISIT (OUTPATIENT)
Dept: LAB | Facility: HOSPITAL | Age: 46
End: 2021-02-24
Attending: PSYCHIATRY & NEUROLOGY
Payer: COMMERCIAL

## 2021-02-24 DIAGNOSIS — F31.12 BIPOLAR I DISORDER, MOST RECENT EPISODE (OR CURRENT) MANIC, MODERATE: Primary | ICD-10-CM

## 2021-02-24 DIAGNOSIS — Z79.899 OTHER LONG TERM (CURRENT) DRUG THERAPY: ICD-10-CM

## 2021-02-24 LAB
ALBUMIN SERPL BCP-MCNC: 4.2 G/DL (ref 3.5–5.2)
ALP SERPL-CCNC: 62 U/L (ref 38–126)
ALT SERPL W/O P-5'-P-CCNC: 9 U/L (ref 10–44)
ANION GAP SERPL CALC-SCNC: 9 MMOL/L (ref 8–16)
AST SERPL-CCNC: 21 U/L (ref 15–46)
BASOPHILS # BLD AUTO: 0.05 K/UL (ref 0–0.2)
BASOPHILS NFR BLD: 1.1 % (ref 0–1.9)
BILIRUB SERPL-MCNC: 0.7 MG/DL (ref 0.1–1)
CALCIUM SERPL-MCNC: 9 MG/DL (ref 8.7–10.5)
CHLORIDE SERPL-SCNC: 108 MMOL/L (ref 95–110)
CHOLEST SERPL-MCNC: 219 MG/DL (ref 120–199)
CHOLEST/HDLC SERPL: 4.9 {RATIO} (ref 2–5)
CO2 SERPL-SCNC: 23 MMOL/L (ref 23–29)
CREAT SERPL-MCNC: 0.52 MG/DL (ref 0.5–1.4)
DIFFERENTIAL METHOD: NORMAL
EOSINOPHIL # BLD AUTO: 0.1 K/UL (ref 0–0.5)
EOSINOPHIL NFR BLD: 1.3 % (ref 0–8)
ERYTHROCYTE [DISTWIDTH] IN BLOOD BY AUTOMATED COUNT: 11.6 % (ref 11.5–14.5)
EST. GFR  (AFRICAN AMERICAN): >60 ML/MIN/1.73 M^2
EST. GFR  (NON AFRICAN AMERICAN): >60 ML/MIN/1.73 M^2
ESTIMATED AVG GLUCOSE: 88 MG/DL (ref 68–131)
GLUCOSE SERPL-MCNC: 83 MG/DL (ref 70–110)
HBA1C MFR BLD: 4.7 % (ref 4–5.6)
HCT VFR BLD AUTO: 39.1 % (ref 37–48.5)
HDLC SERPL-MCNC: 45 MG/DL (ref 40–75)
HDLC SERPL: 20.5 % (ref 20–50)
HGB BLD-MCNC: 12.7 G/DL (ref 12–16)
IMM GRANULOCYTES # BLD AUTO: 0.01 K/UL (ref 0–0.04)
IMM GRANULOCYTES NFR BLD AUTO: 0.2 % (ref 0–0.5)
LDLC SERPL CALC-MCNC: 156 MG/DL (ref 63–159)
LYMPHOCYTES # BLD AUTO: 1.7 K/UL (ref 1–4.8)
LYMPHOCYTES NFR BLD: 36.2 % (ref 18–48)
MCH RBC QN AUTO: 28 PG (ref 27–31)
MCHC RBC AUTO-ENTMCNC: 32.5 G/DL (ref 32–36)
MCV RBC AUTO: 86 FL (ref 82–98)
MONOCYTES # BLD AUTO: 0.4 K/UL (ref 0.3–1)
MONOCYTES NFR BLD: 8.8 % (ref 4–15)
NEUTROPHILS # BLD AUTO: 2.4 K/UL (ref 1.8–7.7)
NEUTROPHILS NFR BLD: 52.4 % (ref 38–73)
NONHDLC SERPL-MCNC: 174 MG/DL
NRBC BLD-RTO: 0 /100 WBC
PLATELET # BLD AUTO: 246 K/UL (ref 150–350)
PMV BLD AUTO: 10.4 FL (ref 9.2–12.9)
POTASSIUM SERPL-SCNC: 4.3 MMOL/L (ref 3.5–5.1)
PROT SERPL-MCNC: 7.7 G/DL (ref 6–8.4)
RBC # BLD AUTO: 4.54 M/UL (ref 4–5.4)
SODIUM SERPL-SCNC: 140 MMOL/L (ref 136–145)
T4 FREE SERPL-MCNC: 1.01 NG/DL (ref 0.71–1.51)
TRIGL SERPL-MCNC: 90 MG/DL (ref 30–150)
TSH SERPL DL<=0.005 MIU/L-ACNC: 1.18 UIU/ML (ref 0.4–4)
UUN UR-MCNC: 9 MG/DL (ref 7–17)
VALPROATE SERPL-MCNC: 30.4 UG/ML (ref 50–100)
WBC # BLD AUTO: 4.64 K/UL (ref 3.9–12.7)

## 2021-02-24 PROCEDURE — 83036 HEMOGLOBIN GLYCOSYLATED A1C: CPT

## 2021-02-24 PROCEDURE — 36415 COLL VENOUS BLD VENIPUNCTURE: CPT | Mod: PO

## 2021-02-24 PROCEDURE — 80061 LIPID PANEL: CPT

## 2021-02-24 PROCEDURE — 80053 COMPREHEN METABOLIC PANEL: CPT | Mod: PO

## 2021-02-24 PROCEDURE — 80164 ASSAY DIPROPYLACETIC ACD TOT: CPT | Mod: PO

## 2021-02-24 PROCEDURE — 84443 ASSAY THYROID STIM HORMONE: CPT | Mod: PO

## 2021-02-24 PROCEDURE — 84439 ASSAY OF FREE THYROXINE: CPT

## 2021-02-24 PROCEDURE — 85025 COMPLETE CBC W/AUTO DIFF WBC: CPT | Mod: PO

## 2021-03-05 ENCOUNTER — TELEPHONE (OUTPATIENT)
Dept: OPHTHALMOLOGY | Facility: CLINIC | Age: 46
End: 2021-03-05

## 2021-03-05 ENCOUNTER — OFFICE VISIT (OUTPATIENT)
Dept: CARDIOLOGY | Facility: CLINIC | Age: 46
End: 2021-03-05
Payer: MEDICARE

## 2021-03-05 ENCOUNTER — TELEPHONE (OUTPATIENT)
Dept: INTERNAL MEDICINE | Facility: CLINIC | Age: 46
End: 2021-03-05

## 2021-03-05 VITALS
HEART RATE: 59 BPM | OXYGEN SATURATION: 97 % | BODY MASS INDEX: 26.94 KG/M2 | DIASTOLIC BLOOD PRESSURE: 69 MMHG | HEIGHT: 62 IN | SYSTOLIC BLOOD PRESSURE: 123 MMHG | WEIGHT: 146.38 LBS

## 2021-03-05 DIAGNOSIS — Z80.3 FAMILY HISTORY OF BREAST CANCER IN SISTER: Primary | ICD-10-CM

## 2021-03-05 DIAGNOSIS — Z82.49 FAMILY HISTORY OF HEART DISEASE: ICD-10-CM

## 2021-03-05 DIAGNOSIS — E78.5 DYSLIPIDEMIA: ICD-10-CM

## 2021-03-05 DIAGNOSIS — R45.89 ANXIETY ABOUT HEALTH: ICD-10-CM

## 2021-03-05 DIAGNOSIS — R06.09 DOE (DYSPNEA ON EXERTION): ICD-10-CM

## 2021-03-05 DIAGNOSIS — R68.89 ABNORMAL ANKLE BRACHIAL INDEX (ABI): ICD-10-CM

## 2021-03-05 DIAGNOSIS — R00.2 PALPITATIONS: ICD-10-CM

## 2021-03-05 DIAGNOSIS — R00.0 TACHYCARDIA: ICD-10-CM

## 2021-03-05 DIAGNOSIS — R51.9 GENERALIZED HEADACHES: ICD-10-CM

## 2021-03-05 DIAGNOSIS — I10 ESSENTIAL HYPERTENSION: ICD-10-CM

## 2021-03-05 DIAGNOSIS — Q85.01 NEUROFIBROMATOSIS, TYPE I (VON RECKLINGHAUSEN'S DISEASE): ICD-10-CM

## 2021-03-05 DIAGNOSIS — I73.9 CLAUDICATION: ICD-10-CM

## 2021-03-05 DIAGNOSIS — R07.9 CHEST PAIN AT REST: Primary | ICD-10-CM

## 2021-03-05 DIAGNOSIS — E66.3 OVERWEIGHT (BMI 25.0-29.9): ICD-10-CM

## 2021-03-05 PROCEDURE — 99214 PR OFFICE/OUTPT VISIT, EST, LEVL IV, 30-39 MIN: ICD-10-PCS | Mod: S$PBB,25,, | Performed by: NURSE PRACTITIONER

## 2021-03-05 PROCEDURE — 93000 EKG 12-LEAD: ICD-10-PCS | Mod: S$GLB,,, | Performed by: INTERNAL MEDICINE

## 2021-03-05 PROCEDURE — 99999 PR PBB SHADOW E&M-EST. PATIENT-LVL IV: CPT | Mod: PBBFAC,,, | Performed by: NURSE PRACTITIONER

## 2021-03-05 PROCEDURE — 99214 OFFICE O/P EST MOD 30 MIN: CPT | Mod: PBBFAC,25 | Performed by: NURSE PRACTITIONER

## 2021-03-05 PROCEDURE — 99214 OFFICE O/P EST MOD 30 MIN: CPT | Mod: S$PBB,25,, | Performed by: NURSE PRACTITIONER

## 2021-03-05 PROCEDURE — 93005 ELECTROCARDIOGRAM TRACING: CPT | Mod: PBBFAC | Performed by: INTERNAL MEDICINE

## 2021-03-05 PROCEDURE — 99999 PR PBB SHADOW E&M-EST. PATIENT-LVL IV: ICD-10-PCS | Mod: PBBFAC,,, | Performed by: NURSE PRACTITIONER

## 2021-03-05 PROCEDURE — 93000 ELECTROCARDIOGRAM COMPLETE: CPT | Mod: S$GLB,,, | Performed by: INTERNAL MEDICINE

## 2021-03-05 RX ORDER — DIVALPROEX SODIUM 500 MG/1
1000 TABLET, FILM COATED, EXTENDED RELEASE ORAL NIGHTLY
COMMUNITY
Start: 2021-01-14 | End: 2023-09-08

## 2021-03-08 ENCOUNTER — TELEPHONE (OUTPATIENT)
Dept: CARDIOLOGY | Facility: CLINIC | Age: 46
End: 2021-03-08

## 2021-03-09 PROBLEM — R68.89 ABNORMAL ANKLE BRACHIAL INDEX (ABI): Status: ACTIVE | Noted: 2021-03-09

## 2021-03-10 ENCOUNTER — LAB VISIT (OUTPATIENT)
Dept: LAB | Facility: HOSPITAL | Age: 46
End: 2021-03-10
Attending: NURSE PRACTITIONER
Payer: MEDICARE

## 2021-03-10 DIAGNOSIS — R00.0 TACHYCARDIA: ICD-10-CM

## 2021-03-10 DIAGNOSIS — R51.9 GENERALIZED HEADACHES: ICD-10-CM

## 2021-03-10 DIAGNOSIS — R00.2 PALPITATIONS: ICD-10-CM

## 2021-03-10 DIAGNOSIS — Q85.01 NEUROFIBROMATOSIS, TYPE I (VON RECKLINGHAUSEN'S DISEASE): ICD-10-CM

## 2021-03-10 DIAGNOSIS — I10 ESSENTIAL HYPERTENSION: ICD-10-CM

## 2021-03-10 PROCEDURE — 36415 COLL VENOUS BLD VENIPUNCTURE: CPT | Mod: PO | Performed by: NURSE PRACTITIONER

## 2021-03-10 PROCEDURE — 83835 ASSAY OF METANEPHRINES: CPT | Mod: PO | Performed by: NURSE PRACTITIONER

## 2021-03-11 ENCOUNTER — HOSPITAL ENCOUNTER (OUTPATIENT)
Dept: CARDIOLOGY | Facility: HOSPITAL | Age: 46
Discharge: HOME | End: 2021-03-11
Attending: NURSE PRACTITIONER
Payer: MEDICARE

## 2021-03-11 ENCOUNTER — TELEPHONE (OUTPATIENT)
Dept: CARDIOLOGY | Facility: CLINIC | Age: 46
End: 2021-03-11

## 2021-03-11 DIAGNOSIS — I73.9 CLAUDICATION: ICD-10-CM

## 2021-03-11 DIAGNOSIS — R68.89 ABNORMAL ANKLE BRACHIAL INDEX (ABI): ICD-10-CM

## 2021-03-11 LAB
LEFT ANT TIBIAL SYS PSV: 45 CM/S
LEFT CFA PSV: 112 CM/S
LEFT EXTERNAL ILIAC PSV: 138 CM/S
LEFT PERONEAL SYS PSV: 42 CM/S
LEFT POPLITEAL PSV: 50 CM/S
LEFT POST TIBIAL SYS PSV: 77 CM/S
LEFT PROFUNDA SYS PSV: 77 CM/S
LEFT SUPER FEMORAL DIST SYS PSV: 73 CM/S
LEFT SUPER FEMORAL MID SYS PSV: 76 CM/S
LEFT SUPER FEMORAL OSTIAL SYS PSV: 73 CM/S
LEFT SUPER FEMORAL PROX SYS PSV: 81 CM/S
LEFT TIB/PER TRUNK SYS PSV: 47 CM/S
OHS CV LEFT LOWER EXTREMITY ABI (NO CALC): 1.11
OHS CV RIGHT ABI LOWER EXTREMITY (NO CALC): 1.07
RIGHT ANT TIBIAL SYS PSV: 42 CM/S
RIGHT CFA PSV: 107 CM/S
RIGHT EXTERNAL ILLIAC PSV: 136 CM/S
RIGHT PERONEAL SYS PSV: 34 CM/S
RIGHT POPLITEAL PSV: 52 CM/S
RIGHT POST TIBIAL SYS PSV: 58 CM/S
RIGHT PROFUNDA SYS PSV: 83 CM/S
RIGHT SUPER FEMORAL DIST SYS PSV: 87 CM/S
RIGHT SUPER FEMORAL MID SYS PSV: 102 CM/S
RIGHT SUPER FEMORAL OSTIAL SYS PSV: 104 CM/S
RIGHT SUPER FEMORAL PROX SYS PSV: 97 CM/S
RIGHT TIB/PER TRUNK SYS PSV: 53 CM/S

## 2021-03-11 PROCEDURE — 93925 LOWER EXTREMITY STUDY: CPT | Mod: 26,,, | Performed by: INTERNAL MEDICINE

## 2021-03-11 PROCEDURE — 93925 CV US DOPPLER ARTERIAL LEGS BILATERAL (CUPID ONLY): ICD-10-PCS | Mod: 26,,, | Performed by: INTERNAL MEDICINE

## 2021-03-11 PROCEDURE — 93925 LOWER EXTREMITY STUDY: CPT

## 2021-03-12 ENCOUNTER — TELEPHONE (OUTPATIENT)
Dept: CARDIOLOGY | Facility: CLINIC | Age: 46
End: 2021-03-12

## 2021-03-17 LAB
METANEPH FREE SERPL-MCNC: 80 PG/ML
METANEPHS SERPL-MCNC: 144 PG/ML
NORMETANEPH FREE SERPL-MCNC: 64 PG/ML

## 2021-03-31 ENCOUNTER — HOSPITAL ENCOUNTER (OUTPATIENT)
Dept: RADIOLOGY | Facility: HOSPITAL | Age: 46
Discharge: HOME | End: 2021-03-31
Attending: INTERNAL MEDICINE
Payer: MEDICARE

## 2021-03-31 DIAGNOSIS — Z80.3 FAMILY HISTORY OF BREAST CANCER IN SISTER: ICD-10-CM

## 2021-03-31 PROCEDURE — 77067 SCR MAMMO BI INCL CAD: CPT | Mod: TC,PO

## 2021-06-01 ENCOUNTER — TELEPHONE (OUTPATIENT)
Dept: NEUROLOGY | Facility: CLINIC | Age: 46
End: 2021-06-01

## 2021-06-07 ENCOUNTER — HOSPITAL ENCOUNTER (EMERGENCY)
Facility: HOSPITAL | Age: 46
Discharge: HOME | End: 2021-06-07
Attending: FAMILY MEDICINE
Payer: MEDICARE

## 2021-06-07 VITALS
OXYGEN SATURATION: 99 % | BODY MASS INDEX: 26.68 KG/M2 | HEART RATE: 97 BPM | TEMPERATURE: 98 F | DIASTOLIC BLOOD PRESSURE: 73 MMHG | SYSTOLIC BLOOD PRESSURE: 125 MMHG | WEIGHT: 145 LBS | HEIGHT: 62 IN | RESPIRATION RATE: 18 BRPM

## 2021-06-07 DIAGNOSIS — M25.512 ACUTE PAIN OF LEFT SHOULDER: Primary | ICD-10-CM

## 2021-06-07 DIAGNOSIS — R20.2 PARESTHESIA: ICD-10-CM

## 2021-06-07 DIAGNOSIS — M25.519 SHOULDER PAIN: ICD-10-CM

## 2021-06-07 PROCEDURE — 96372 THER/PROPH/DIAG INJ SC/IM: CPT | Mod: ER

## 2021-06-07 PROCEDURE — 63600175 PHARM REV CODE 636 W HCPCS: Mod: ER | Performed by: PHYSICIAN ASSISTANT

## 2021-06-07 PROCEDURE — 99284 EMERGENCY DEPT VISIT MOD MDM: CPT | Mod: 25,ER

## 2021-06-07 RX ORDER — KETOROLAC TROMETHAMINE 30 MG/ML
30 INJECTION, SOLUTION INTRAMUSCULAR; INTRAVENOUS
Status: COMPLETED | OUTPATIENT
Start: 2021-06-07 | End: 2021-06-07

## 2021-06-07 RX ORDER — NAPROXEN 500 MG/1
500 TABLET ORAL 2 TIMES DAILY WITH MEALS
Qty: 12 TABLET | Refills: 0 | Status: SHIPPED | OUTPATIENT
Start: 2021-06-07 | End: 2022-04-12

## 2021-06-07 RX ORDER — METHOCARBAMOL 500 MG/1
500 TABLET, FILM COATED ORAL 3 TIMES DAILY
Qty: 15 TABLET | Refills: 0 | Status: SHIPPED | OUTPATIENT
Start: 2021-06-07 | End: 2021-06-12

## 2021-06-07 RX ADMIN — KETOROLAC TROMETHAMINE 30 MG: 30 INJECTION, SOLUTION INTRAMUSCULAR; INTRAVENOUS at 12:06

## 2021-06-16 ENCOUNTER — PES CALL (OUTPATIENT)
Dept: ADMINISTRATIVE | Facility: CLINIC | Age: 46
End: 2021-06-16

## 2021-07-02 ENCOUNTER — TELEPHONE (OUTPATIENT)
Dept: FAMILY MEDICINE | Facility: CLINIC | Age: 46
End: 2021-07-02

## 2021-11-09 ENCOUNTER — LAB VISIT (OUTPATIENT)
Dept: LAB | Facility: HOSPITAL | Age: 46
End: 2021-11-09
Attending: PSYCHIATRY & NEUROLOGY
Payer: MEDICAID

## 2021-11-09 DIAGNOSIS — F31.12 BIPOLAR I DISORDER, MOST RECENT EPISODE (OR CURRENT) MANIC, MODERATE: Primary | ICD-10-CM

## 2021-11-09 DIAGNOSIS — Z79.899 OTHER LONG TERM (CURRENT) DRUG THERAPY: ICD-10-CM

## 2021-11-09 LAB — VALPROATE SERPL-MCNC: 72.6 UG/ML (ref 50–100)

## 2021-11-09 PROCEDURE — 36415 COLL VENOUS BLD VENIPUNCTURE: CPT | Mod: PO | Performed by: PSYCHIATRY & NEUROLOGY

## 2021-11-09 PROCEDURE — 80164 ASSAY DIPROPYLACETIC ACD TOT: CPT | Mod: PO | Performed by: PSYCHIATRY & NEUROLOGY

## 2021-12-17 DIAGNOSIS — E78.00 PURE HYPERCHOLESTEROLEMIA: ICD-10-CM

## 2021-12-18 DIAGNOSIS — I10 ESSENTIAL HYPERTENSION: ICD-10-CM

## 2021-12-23 RX ORDER — AMLODIPINE BESYLATE 5 MG/1
TABLET ORAL
Qty: 90 TABLET | Refills: 0 | Status: SHIPPED | OUTPATIENT
Start: 2021-12-23 | End: 2022-04-12

## 2021-12-23 RX ORDER — ATORVASTATIN CALCIUM 10 MG/1
TABLET, FILM COATED ORAL
Qty: 90 TABLET | Refills: 0 | Status: SHIPPED | OUTPATIENT
Start: 2021-12-23 | End: 2022-04-12 | Stop reason: SDUPTHER

## 2022-04-12 ENCOUNTER — OFFICE VISIT (OUTPATIENT)
Dept: CARDIOLOGY | Facility: CLINIC | Age: 47
End: 2022-04-12
Payer: MEDICARE

## 2022-04-12 VITALS
HEIGHT: 62 IN | WEIGHT: 141 LBS | HEART RATE: 89 BPM | OXYGEN SATURATION: 97 % | BODY MASS INDEX: 25.95 KG/M2 | SYSTOLIC BLOOD PRESSURE: 109 MMHG | DIASTOLIC BLOOD PRESSURE: 79 MMHG

## 2022-04-12 DIAGNOSIS — R29.818 SUSPECTED SLEEP APNEA: ICD-10-CM

## 2022-04-12 DIAGNOSIS — E78.00 PURE HYPERCHOLESTEROLEMIA: ICD-10-CM

## 2022-04-12 DIAGNOSIS — R07.9 CHEST PAIN, UNSPECIFIED TYPE: ICD-10-CM

## 2022-04-12 DIAGNOSIS — I10 ESSENTIAL HYPERTENSION: Primary | ICD-10-CM

## 2022-04-12 DIAGNOSIS — R94.31 ABNORMAL EKG: ICD-10-CM

## 2022-04-12 DIAGNOSIS — E78.5 HYPERLIPIDEMIA, UNSPECIFIED HYPERLIPIDEMIA TYPE: ICD-10-CM

## 2022-04-12 PROCEDURE — 3008F PR BODY MASS INDEX (BMI) DOCUMENTED: ICD-10-PCS | Mod: CPTII,S$GLB,, | Performed by: INTERNAL MEDICINE

## 2022-04-12 PROCEDURE — 99204 OFFICE O/P NEW MOD 45 MIN: CPT | Mod: S$GLB,,, | Performed by: INTERNAL MEDICINE

## 2022-04-12 PROCEDURE — 99204 PR OFFICE/OUTPT VISIT, NEW, LEVL IV, 45-59 MIN: ICD-10-PCS | Mod: S$GLB,,, | Performed by: INTERNAL MEDICINE

## 2022-04-12 PROCEDURE — 99999 PR PBB SHADOW E&M-EST. PATIENT-LVL III: CPT | Mod: PBBFAC,,, | Performed by: INTERNAL MEDICINE

## 2022-04-12 PROCEDURE — 3074F PR MOST RECENT SYSTOLIC BLOOD PRESSURE < 130 MM HG: ICD-10-PCS | Mod: CPTII,S$GLB,, | Performed by: INTERNAL MEDICINE

## 2022-04-12 PROCEDURE — 1160F PR REVIEW ALL MEDS BY PRESCRIBER/CLIN PHARMACIST DOCUMENTED: ICD-10-PCS | Mod: CPTII,S$GLB,, | Performed by: INTERNAL MEDICINE

## 2022-04-12 PROCEDURE — 1160F RVW MEDS BY RX/DR IN RCRD: CPT | Mod: CPTII,S$GLB,, | Performed by: INTERNAL MEDICINE

## 2022-04-12 PROCEDURE — 3074F SYST BP LT 130 MM HG: CPT | Mod: CPTII,S$GLB,, | Performed by: INTERNAL MEDICINE

## 2022-04-12 PROCEDURE — 99999 PR PBB SHADOW E&M-EST. PATIENT-LVL III: ICD-10-PCS | Mod: PBBFAC,,, | Performed by: INTERNAL MEDICINE

## 2022-04-12 PROCEDURE — 1159F MED LIST DOCD IN RCRD: CPT | Mod: CPTII,S$GLB,, | Performed by: INTERNAL MEDICINE

## 2022-04-12 PROCEDURE — 3008F BODY MASS INDEX DOCD: CPT | Mod: CPTII,S$GLB,, | Performed by: INTERNAL MEDICINE

## 2022-04-12 PROCEDURE — 1159F PR MEDICATION LIST DOCUMENTED IN MEDICAL RECORD: ICD-10-PCS | Mod: CPTII,S$GLB,, | Performed by: INTERNAL MEDICINE

## 2022-04-12 PROCEDURE — 3078F PR MOST RECENT DIASTOLIC BLOOD PRESSURE < 80 MM HG: ICD-10-PCS | Mod: CPTII,S$GLB,, | Performed by: INTERNAL MEDICINE

## 2022-04-12 PROCEDURE — 3078F DIAST BP <80 MM HG: CPT | Mod: CPTII,S$GLB,, | Performed by: INTERNAL MEDICINE

## 2022-04-12 RX ORDER — ATORVASTATIN CALCIUM 10 MG/1
10 TABLET, FILM COATED ORAL DAILY
Qty: 90 TABLET | Refills: 3 | Status: SHIPPED | OUTPATIENT
Start: 2022-04-12 | End: 2024-03-15 | Stop reason: SDUPTHER

## 2022-04-12 RX ORDER — CARIPRAZINE 3 MG/1
1 CAPSULE, GELATIN COATED ORAL DAILY
COMMUNITY
Start: 2022-04-01 | End: 2023-09-08

## 2022-04-12 NOTE — PROGRESS NOTES
Cardiology Clinic note    Subjective:   Patient ID:  Kenzie Saunders is a 46 y.o. female who presents for HTN FUP    HPI:   CP, SOB: Reports sometimes when upset. Does not exercise.    HTN: Not on medications. Salt restriction.    HLD: Tolerating statin    SH Tobacco Never used  FH Father had  Mi at age 36    Patient Active Problem List    Diagnosis Date Noted    Abnormal ankle brachial index (NABILA) 03/09/2021    Chest pain at rest 11/03/2020    Palpitations 11/03/2020    PADGETT (dyspnea on exertion) 11/03/2020    Essential hypertension 11/03/2020    Dyslipidemia 11/03/2020    Family history of heart disease 11/03/2020    Anxiety about health 11/03/2020    Overweight (BMI 25.0-29.9) 11/03/2020    Undifferentiated schizophrenia 07/02/2018    Family history of breast cancer in sister 04/27/2017    Pure hypercholesterolemia 03/27/2017    Neurofibromatosis, type I (von Recklinghausen's disease) 08/24/2016    GERD (gastroesophageal reflux disease) 08/24/2016    Bipolar disorder 08/24/2016       Patient's Medications   New Prescriptions    No medications on file   Previous Medications    DIVALPROEX ER (DEPAKOTE) 500 MG TB24    Take 1,000 mg by mouth nightly.    ESCITALOPRAM OXALATE (LEXAPRO ORAL)    Take 25 mg by mouth.     TRAZODONE (DESYREL) 100 MG TABLET    Take 100 mg by mouth nightly.    VRAYLAR 3 MG CAP    Take 1 capsule by mouth once daily.   Modified Medications    Modified Medication Previous Medication    ATORVASTATIN (LIPITOR) 10 MG TABLET atorvastatin (LIPITOR) 10 MG tablet       Take 1 tablet (10 mg total) by mouth once daily.    TAKE 1 TABLET BY MOUTH EVERY DAY   Discontinued Medications    AMLODIPINE (NORVASC) 5 MG TABLET    TAKE 1 TABLET BY MOUTH EVERY DAY    METOPROLOL SUCCINATE (TOPROL-XL) 25 MG 24 HR TABLET    Take 1 tablet (25 mg total) by mouth once daily.    NAPROXEN (NAPROSYN) 500 MG TABLET    Take 1 tablet (500 mg total) by mouth 2 (two) times daily with meals.        Review of  "Systems   Constitutional: Negative for fever.   HENT: Negative for nosebleeds.    Cardiovascular: Negative for irregular heartbeat, leg swelling, near-syncope, palpitations, paroxysmal nocturnal dyspnea and syncope.        As above   Respiratory: Negative for hemoptysis.    Hematologic/Lymphatic: Negative for bleeding problem.   Musculoskeletal: Positive for arthritis.   Gastrointestinal: Negative for hematochezia.   Genitourinary: Negative for hematuria.   Neurological: Seizures: h/o seizures.   Allergic/Immunologic: Positive for environmental allergies.         Objective:   Vitals  Vitals:    04/12/22 1356   BP: 109/79   Pulse: 89   SpO2: 97%   Weight: 64 kg (141 lb)   Height: 5' 2" (1.575 m)          Physical Exam  Constitutional:       General: She is not in acute distress.     Appearance: She is well-developed. She is not diaphoretic.   HENT:      Head: Normocephalic.   Neck:      Vascular: No JVD.   Cardiovascular:      Rate and Rhythm: Normal rate and regular rhythm.      Heart sounds: No murmur heard.    No friction rub. No gallop.   Pulmonary:      Effort: Pulmonary effort is normal. No respiratory distress.      Breath sounds: Normal breath sounds.   Abdominal:      Palpations: Abdomen is soft.      Tenderness: There is no abdominal tenderness.   Musculoskeletal:         General: No swelling.      Cervical back: Normal range of motion.   Skin:     General: Skin is warm.   Neurological:      Mental Status: She is alert.   Psychiatric:         Mood and Affect: Mood normal.             Assessment:     1. Essential hypertension    2. Hyperlipidemia, unspecified hyperlipidemia type    3. Chest pain, unspecified type    4. Pure hypercholesterolemia    5. Suspected sleep apnea    6. Abnormal EKG        Plan:   Kenzie Saunders is a 46 y.o. female h/o HTN, HLD  Bipolar disorder  Neurofibromatosis type 1    - EKG personally reviewed. My interpretation:  4/12/22: SR 80s, LAD. RBBB. PRWP. QTc 461  3/5/21: SR 60s, " normal axis. IRBBB. QTc 441  - Dobutamine echo stress test 2018  Negative for ischemia (EKG, echo). MPHR 97%.  - Echo 2018    1 - Normal left ventricular systolic function (EF 55-60%).     2 - Normal left ventricular diastolic function.     3 - Normal right ventricular systolic function .   - Holter 2020  Sinus rhythm throughout this recording period  Rare PACs and PVCs  Monitoring.  25 hours, monitor came off after that time  Episode of tachycardia heart rate 150 beats per minute 1300 hours, rhythm suggest possible atrial flutter ( versus sinus tachycardia)  No pauses or periods of significant bradycardia  No diary entries returned  No atrial fibrillation seen  - NABILA March 2021  No evidence of infrainguinal PAD bilaterally.  Tri- and biphasic waveforms throughout.  Normal NABILA bilaterally.    CP, SOB  Description not c/w angina  Recommend exercise program    HTN  BP well controlled  Salt restriction  Ran out of BP pills after Alina    HLD  Lab Results   Component Value Date    LDLCALC 156.0 02/24/2021   Statin as above      Continue with current medical plan and lifestyle changes.    Orders Placed This Encounter   Procedures    Ambulatory referral/consult to Sleep Disorders     Standing Status:   Future     Standing Expiration Date:   5/12/2023     Referral Priority:   Routine     Referral Type:   Consultation     Requested Specialty:   Sleep Medicine     Number of Visits Requested:   1    Echo     Standing Status:   Future     Standing Expiration Date:   4/12/2023     Order Specific Question:   Release to patient     Answer:   Immediate       Follow up as scheduled  Return sooner for concerns or questions. If symptoms persist go to the ED    She expressed verbal understanding and agreed with the plan      Annabelle Gaytan MD  Interventional Cardiology  [facility]  Phone: [phone]

## 2022-04-18 ENCOUNTER — HOSPITAL ENCOUNTER (OUTPATIENT)
Dept: CARDIOLOGY | Facility: HOSPITAL | Age: 47
Discharge: HOME | End: 2022-04-18
Attending: INTERNAL MEDICINE
Payer: MEDICARE

## 2022-04-18 VITALS — HEIGHT: 62 IN | WEIGHT: 141 LBS | BODY MASS INDEX: 25.95 KG/M2

## 2022-04-18 DIAGNOSIS — R94.31 ABNORMAL EKG: ICD-10-CM

## 2022-04-18 LAB
AORTIC ROOT ANNULUS: 2.28 CM
AORTIC VALVE CUSP SEPERATION: 1.95 CM
AV INDEX (PROSTH): 0.92
AV MEAN GRADIENT: 4 MMHG
AV PEAK GRADIENT: 6 MMHG
AV VALVE AREA: 3.16 CM2
AV VELOCITY RATIO: 0.99
BSA FOR ECHO PROCEDURE: 1.67 M2
CV ECHO LV RWT: 0.45 CM
DOP CALC AO PEAK VEL: 1.22 M/S
DOP CALC AO VTI: 29.52 CM
DOP CALC LVOT AREA: 3.4 CM2
DOP CALC LVOT DIAMETER: 2.09 CM
DOP CALC LVOT PEAK VEL: 1.21 M/S
DOP CALC LVOT STROKE VOLUME: 93.34 CM3
DOP CALC MV VTI: 43.68 CM
DOP CALCLVOT PEAK VEL VTI: 27.22 CM
E WAVE DECELERATION TIME: 186.48 MSEC
E/A RATIO: 1.83
E/E' RATIO: 8.25 M/S
ECHO LV POSTERIOR WALL: 0.87 CM (ref 0.6–1.1)
EJECTION FRACTION: 60 %
FRACTIONAL SHORTENING: 41 % (ref 28–44)
INTERVENTRICULAR SEPTUM: 0.91 CM (ref 0.6–1.1)
IVC PROX: 1.4 CM
LA MAJOR: 4.12 CM
LA MINOR: 3.64 CM
LA WIDTH: 3.89 CM
LEFT ATRIUM SIZE: 3.3 CM
LEFT ATRIUM VOLUME INDEX MOD: 16.3 ML/M2
LEFT ATRIUM VOLUME INDEX: 25.6 ML/M2
LEFT ATRIUM VOLUME MOD: 26.85 CM3
LEFT ATRIUM VOLUME: 42.17 CM3
LEFT INTERNAL DIMENSION IN SYSTOLE: 2.28 CM (ref 2.1–4)
LEFT VENTRICLE DIASTOLIC VOLUME INDEX: 39.66 ML/M2
LEFT VENTRICLE DIASTOLIC VOLUME: 65.44 ML
LEFT VENTRICLE MASS INDEX: 63 G/M2
LEFT VENTRICLE SYSTOLIC VOLUME INDEX: 10.8 ML/M2
LEFT VENTRICLE SYSTOLIC VOLUME: 17.79 ML
LEFT VENTRICULAR INTERNAL DIMENSION IN DIASTOLE: 3.89 CM (ref 3.5–6)
LEFT VENTRICULAR MASS: 103.29 G
LV LATERAL E/E' RATIO: 8.25 M/S
LV SEPTAL E/E' RATIO: 8.25 M/S
LVOT MG: 3 MMHG
LVOT MV: 0.87 CM/S
MV A" WAVE DURATION": 17.07 MSEC
MV MEAN GRADIENT: 1 MMHG
MV PEAK A VEL: 0.54 M/S
MV PEAK E VEL: 0.99 M/S
MV PEAK GRADIENT: 5 MMHG
MV STENOSIS PRESSURE HALF TIME: 54.08 MS
MV VALVE AREA BY CONTINUITY EQUATION: 2.14 CM2
MV VALVE AREA P 1/2 METHOD: 4.07 CM2
PISA MRMAX VEL: 0.03 M/S
PISA TR MAX VEL: 1.59 M/S
PULM VEIN S/D RATIO: 0.76
PV PEAK D VEL: 0.67 M/S
PV PEAK S VEL: 0.51 M/S
PV PEAK VELOCITY: 1.04 CM/S
RA MAJOR: 3.49 CM
RA PRESSURE: 3 MMHG
RA WIDTH: 3.3 CM
RIGHT VENTRICULAR END-DIASTOLIC DIMENSION: 1.75 CM
RV TISSUE DOPPLER FREE WALL SYSTOLIC VELOCITY 1 (APICAL 4 CHAMBER VIEW): 11.68 CM/S
SINUS: 2.69 CM
TDI LATERAL: 0.12 M/S
TDI SEPTAL: 0.12 M/S
TDI: 0.12 M/S
TR MAX PG: 10 MMHG
TRICUSPID ANNULAR PLANE SYSTOLIC EXCURSION: 2.19 CM
TV REST PULMONARY ARTERY PRESSURE: 13 MMHG

## 2022-04-18 PROCEDURE — 93306 TTE W/DOPPLER COMPLETE: CPT | Mod: PO

## 2022-04-18 PROCEDURE — 93306 ECHO (CUPID ONLY): ICD-10-PCS | Mod: 26,,, | Performed by: INTERNAL MEDICINE

## 2022-04-18 PROCEDURE — 93306 TTE W/DOPPLER COMPLETE: CPT | Mod: 26,,, | Performed by: INTERNAL MEDICINE

## 2022-04-21 ENCOUNTER — PATIENT MESSAGE (OUTPATIENT)
Dept: CARDIOLOGY | Facility: CLINIC | Age: 47
End: 2022-04-21
Payer: MEDICARE

## 2022-04-29 ENCOUNTER — PATIENT MESSAGE (OUTPATIENT)
Dept: NEUROLOGY | Facility: CLINIC | Age: 47
End: 2022-04-29
Payer: MEDICARE

## 2022-06-15 ENCOUNTER — TELEPHONE (OUTPATIENT)
Dept: SLEEP MEDICINE | Facility: CLINIC | Age: 47
End: 2022-06-15
Payer: MEDICAID

## 2022-06-24 ENCOUNTER — OFFICE VISIT (OUTPATIENT)
Dept: SLEEP MEDICINE | Facility: CLINIC | Age: 47
End: 2022-06-24
Payer: MEDICARE

## 2022-06-24 DIAGNOSIS — R29.818 SUSPECTED SLEEP APNEA: ICD-10-CM

## 2022-06-24 DIAGNOSIS — E66.3 OVERWEIGHT (BMI 25.0-29.9): ICD-10-CM

## 2022-06-24 DIAGNOSIS — R06.09 DOE (DYSPNEA ON EXERTION): ICD-10-CM

## 2022-06-24 DIAGNOSIS — F20.3 UNDIFFERENTIATED SCHIZOPHRENIA: ICD-10-CM

## 2022-06-24 DIAGNOSIS — E78.5 DYSLIPIDEMIA: ICD-10-CM

## 2022-06-24 DIAGNOSIS — R06.83 SNORING: ICD-10-CM

## 2022-06-24 DIAGNOSIS — K21.9 GASTROESOPHAGEAL REFLUX DISEASE, UNSPECIFIED WHETHER ESOPHAGITIS PRESENT: ICD-10-CM

## 2022-06-24 DIAGNOSIS — F51.05 INSOMNIA DUE TO MENTAL DISORDER: ICD-10-CM

## 2022-06-24 DIAGNOSIS — R00.2 PALPITATIONS: Primary | ICD-10-CM

## 2022-06-24 DIAGNOSIS — I10 ESSENTIAL HYPERTENSION: ICD-10-CM

## 2022-06-24 PROCEDURE — 1160F PR REVIEW ALL MEDS BY PRESCRIBER/CLIN PHARMACIST DOCUMENTED: ICD-10-PCS | Mod: CPTII,95,, | Performed by: INTERNAL MEDICINE

## 2022-06-24 PROCEDURE — 99202 PR OFFICE/OUTPT VISIT, NEW, LEVL II, 15-29 MIN: ICD-10-PCS | Mod: 95,,, | Performed by: INTERNAL MEDICINE

## 2022-06-24 PROCEDURE — 1159F MED LIST DOCD IN RCRD: CPT | Mod: CPTII,95,, | Performed by: INTERNAL MEDICINE

## 2022-06-24 PROCEDURE — 99202 OFFICE O/P NEW SF 15 MIN: CPT | Mod: 95,,, | Performed by: INTERNAL MEDICINE

## 2022-06-24 PROCEDURE — 1160F RVW MEDS BY RX/DR IN RCRD: CPT | Mod: CPTII,95,, | Performed by: INTERNAL MEDICINE

## 2022-06-24 PROCEDURE — 1159F PR MEDICATION LIST DOCUMENTED IN MEDICAL RECORD: ICD-10-PCS | Mod: CPTII,95,, | Performed by: INTERNAL MEDICINE

## 2022-06-24 NOTE — PROGRESS NOTES
Subjective:       Patient ID: Kenzie Saunders is a 47 y.o. female.    Chief Complaint: Sleeping Problem    I had the pleasure of seeing Kenzie Saunders today, who is a 47 y.o. female that presents with interrupted sleep.    Kenzie Saunders does not have a CDL.    Kenzie Saunders is not a shift worker.    Kenzie Saunedrs presents with has interrupted sleep that has been going on for 5+ years    Bedtime when working ranges from NA to NA.   When not working, bedtime ranges from 2200 to 2300.   Sleep latency ranges from 20 to 30 minutes.     Average number of awakenings is 2-3 and return to sleep is variable.   Wake up time when working is na to na.   When not working, wake up time is 0700 to 0730.   Patient does not feel rested upon awakening.    Kenzie Saunders consumes approximately 5-6 beverages with caffeine daily.   An average of 0 beverages with alcohol are consumed    Medications taken for sleep currently: trazodone  Previous medications taken: none     Kenzie Saunders does experience daytime sleepiness.   Naps are taken about 0 times weekly, usually lasting NA to NA minutes.  Kenzie currently does not operate an automobile.  Kenzie Saunders NA experience drowsiness when driving.   Patient does doze off when sedentary.   Kenzie Saunders does not have auxiliary symptoms of narcolepsy including sleep onset paralysis, hypnagogic hallucinations, sleep attacks and cataplexy    EPWORTH SLEEPINESS SCALE 6/24/2022   Sitting and reading 1   Watching TV 2   Sitting, inactive in a public place (e.g. a theatre or a meeting) 1   As a passenger in a car for an hour without a break 2   Lying down to rest in the afternoon when circumstances permit 1   Sitting and talking to someone 1   Sitting quietly after a lunch without alcohol 1   In a car, while stopped for a few minutes in traffic 2   Total score 11       Kenzie Saunders has a history of snoring.   Snoring is described as unknown.   Apneic episodes  have been noticed during sleep.   A witness to sleep is not present.   The patient awakens with mouth dryness.      Kenzie Saunders does not have symptoms of Restless Legs Syndrome. Nocturnal leg movements have not been noticed.   The patient does not experience sleep related leg cramps.   There is not a history of parasomnia.      Current Outpatient Medications:     atorvastatin (LIPITOR) 10 MG tablet, Take 1 tablet (10 mg total) by mouth once daily., Disp: 90 tablet, Rfl: 3    divalproex ER (DEPAKOTE) 500 MG Tb24, Take 1,000 mg by mouth nightly., Disp: , Rfl:     escitalopram oxalate (LEXAPRO ORAL), Take 25 mg by mouth. , Disp: , Rfl:     traZODone (DESYREL) 100 MG tablet, Take 100 mg by mouth nightly., Disp: , Rfl:     VRAYLAR 3 mg Cap, Take 1 capsule by mouth once daily., Disp: , Rfl:      Review of patient's allergies indicates:   Allergen Reactions    Haloperidol lactate Hives    Prevacid [lansoprazole] Hives    Risperidone analogues Other (See Comments)     Elevates prolactin levels         Past Medical History:   Diagnosis Date    Astigmatism     Bipolar 2 disorder     ECHO showing normal mitral valve 3/27/2017    GERD (gastroesophageal reflux disease)     Migraine headache     Neurofibromatosis, type 1 (von Recklinghausen's disease)     patient report MVP but ECHO showing normal mitral valve 3/27/2017    Sciatica        Past Surgical History:   Procedure Laterality Date    HYSTERECTOMY         Family History   Problem Relation Age of Onset    Neurofibromatosis Mother     Neurofibromatosis Maternal Aunt     Breast cancer Maternal Aunt     Neurofibromatosis Maternal Grandfather     Neurofibromatosis Cousin        Social History     Socioeconomic History    Marital status: Single   Tobacco Use    Smoking status: Never Smoker    Smokeless tobacco: Never Used   Substance and Sexual Activity    Alcohol use: No    Drug use: No    Sexual activity: Never           Old medical  records.    There were no vitals filed for this visit.           The patient was given open opportunity to ask questions and/or express concerns about treatment plan.   All questions/concerns were discussed.   Driving precautions were provided.     Two patient identifiers used prior to evaluation.    Thank you for referring Kenzie Saunders for evaluation.             Past Medical History:   Diagnosis Date    Astigmatism     Bipolar 2 disorder     ECHO showing normal mitral valve 3/27/2017    GERD (gastroesophageal reflux disease)     Migraine headache     Neurofibromatosis, type 1 (von Recklinghausen's disease)     patient report MVP but ECHO showing normal mitral valve 3/27/2017    Sciatica      Past Surgical History:   Procedure Laterality Date    HYSTERECTOMY       Family History   Problem Relation Age of Onset    Neurofibromatosis Mother     Neurofibromatosis Maternal Aunt     Breast cancer Maternal Aunt     Neurofibromatosis Maternal Grandfather     Neurofibromatosis Cousin      Social History     Socioeconomic History    Marital status: Single   Tobacco Use    Smoking status: Never Smoker    Smokeless tobacco: Never Used   Substance and Sexual Activity    Alcohol use: No    Drug use: No    Sexual activity: Never       Current Outpatient Medications   Medication Sig Dispense Refill    atorvastatin (LIPITOR) 10 MG tablet Take 1 tablet (10 mg total) by mouth once daily. 90 tablet 3    divalproex ER (DEPAKOTE) 500 MG Tb24 Take 1,000 mg by mouth nightly.      escitalopram oxalate (LEXAPRO ORAL) Take 25 mg by mouth.       traZODone (DESYREL) 100 MG tablet Take 100 mg by mouth nightly.      VRAYLAR 3 mg Cap Take 1 capsule by mouth once daily.       No current facility-administered medications for this visit.     Review of patient's allergies indicates:   Allergen Reactions    Haloperidol lactate Hives    Prevacid [lansoprazole] Hives    Risperidone analogues Other (See Comments)      Elevates prolactin levels       Review of Systems    Objective:      There were no vitals filed for this visit.  Physical Exam    Lab Review:   CBC:   Lab Results   Component Value Date    WBC 4.64 02/24/2021    RBC 4.54 02/24/2021    HGB 12.7 02/24/2021    HCT 39.1 02/24/2021     02/24/2021     BMP:   Lab Results   Component Value Date    GLU 83 02/24/2021     02/24/2021    K 4.3 02/24/2021     02/24/2021    CO2 23 02/24/2021    BUN 9 02/24/2021    CREATININE 0.52 02/24/2021    CALCIUM 9.0 02/24/2021     Diagnostics Review: Echo: Reviewed     Assessment:       1. Palpitations    2. Suspected sleep apnea    3. Dyslipidemia    4. Overweight (BMI 25.0-29.9)    5. Essential hypertension    6. PADGETT (dyspnea on exertion)    7. Undifferentiated schizophrenia    8. Gastroesophageal reflux disease, unspecified whether esophagitis present    9. Snoring    10. Insomnia due to mental disorder        Plan:       Due to listed symptoms, a polysomnogram is recommended and ordered.   Description of procedure given to patient.   If significant Obstructive Sleep Apnea (SHOAIB) is found during the initial portion of the study, therapy will be initiated with nasal Continuous Positive Airway Pressure (CPAP).   Goals of therapy were discussed, alternative treatments listed and patient agrees to this form of therapy if indicated.   The pathophysiology of SHOAIB was discussed.   The effects of SHOAIB on patient's co-morbid conditions and the increased morbidity and/or mortality associated with this condition were reviewed.   The patient was given open opportunity to ask questions and/or express concerns about treatment plan.   All questions/concerns were discussed.   Driving precautions were provided.       Thank you for referring Kenzie Saunders for evaluation.       The patient location is: home  The chief complaint leading to consultation is: interrupted    Visit type: audiovisual    Face to Face time with patient: 8  22  minutes of total time spent on the encounter, which includes face to face time and non-face to face time preparing to see the patient (eg, review of tests), Obtaining and/or reviewing separately obtained history, Documenting clinical information in the electronic or other health record, Independently interpreting results (not separately reported) and communicating results to the patient/family/caregiver, or Care coordination (not separately reported).         Each patient to whom he or she provides medical services by telemedicine is:  (1) informed of the relationship between the physician and patient and the respective role of any other health care provider with respect to management of the patient; and (2) notified that he or she may decline to receive medical services by telemedicine and may withdraw from such care at any time.    Notes:

## 2022-06-29 DIAGNOSIS — E78.00 PURE HYPERCHOLESTEROLEMIA: ICD-10-CM

## 2022-06-29 RX ORDER — ATORVASTATIN CALCIUM 10 MG/1
10 TABLET, FILM COATED ORAL DAILY
Qty: 90 TABLET | Refills: 3 | Status: CANCELLED | OUTPATIENT
Start: 2022-06-29

## 2022-06-29 NOTE — TELEPHONE ENCOUNTER
Reached out to and notified that refills are available with her pharmacy.   Responded that she thought the office could have her medication refilled for her.  She responded that she has already spoken to [facility] and her medication will be filled today.

## 2022-07-03 ENCOUNTER — PATIENT MESSAGE (OUTPATIENT)
Dept: SLEEP MEDICINE | Facility: CLINIC | Age: 47
End: 2022-07-03
Payer: MEDICARE

## 2022-07-06 ENCOUNTER — TELEPHONE (OUTPATIENT)
Dept: SLEEP MEDICINE | Facility: OTHER | Age: 47
End: 2022-07-06
Payer: MEDICARE

## 2022-07-06 ENCOUNTER — PATIENT MESSAGE (OUTPATIENT)
Dept: SLEEP MEDICINE | Facility: CLINIC | Age: 47
End: 2022-07-06
Payer: MEDICARE

## 2022-10-27 ENCOUNTER — LAB VISIT (OUTPATIENT)
Dept: LAB | Facility: HOSPITAL | Age: 47
End: 2022-10-27
Attending: PSYCHIATRY & NEUROLOGY
Payer: MEDICARE

## 2022-10-27 ENCOUNTER — OFFICE VISIT (OUTPATIENT)
Dept: FAMILY MEDICINE | Facility: CLINIC | Age: 47
End: 2022-10-27
Payer: MEDICARE

## 2022-10-27 VITALS
WEIGHT: 143.31 LBS | DIASTOLIC BLOOD PRESSURE: 86 MMHG | HEIGHT: 62 IN | BODY MASS INDEX: 26.37 KG/M2 | SYSTOLIC BLOOD PRESSURE: 138 MMHG | OXYGEN SATURATION: 99 % | HEART RATE: 94 BPM

## 2022-10-27 DIAGNOSIS — Z12.11 COLON CANCER SCREENING: ICD-10-CM

## 2022-10-27 DIAGNOSIS — M54.9 DORSALGIA, UNSPECIFIED: Primary | ICD-10-CM

## 2022-10-27 DIAGNOSIS — F41.9 ANXIETY: ICD-10-CM

## 2022-10-27 DIAGNOSIS — Z23 NEED FOR INFLUENZA VACCINATION: ICD-10-CM

## 2022-10-27 DIAGNOSIS — Q85.01 NEUROFIBROMATOSIS, TYPE I (VON RECKLINGHAUSEN'S DISEASE): ICD-10-CM

## 2022-10-27 DIAGNOSIS — F31.12 BIPOLAR I DISORDER, MOST RECENT EPISODE (OR CURRENT) MANIC, MODERATE: Primary | ICD-10-CM

## 2022-10-27 DIAGNOSIS — Z12.31 SCREENING MAMMOGRAM FOR HIGH-RISK PATIENT: ICD-10-CM

## 2022-10-27 LAB
ALBUMIN SERPL BCP-MCNC: 4.5 G/DL (ref 3.5–5.2)
ALP SERPL-CCNC: 82 U/L (ref 38–126)
ALT SERPL W/O P-5'-P-CCNC: 20 U/L (ref 10–44)
ANION GAP SERPL CALC-SCNC: 9 MMOL/L (ref 8–16)
AST SERPL-CCNC: 51 U/L (ref 15–46)
BASOPHILS # BLD AUTO: 0.03 K/UL (ref 0–0.2)
BASOPHILS NFR BLD: 0.6 % (ref 0–1.9)
BILIRUB SERPL-MCNC: 0.8 MG/DL (ref 0.1–1)
CALCIUM SERPL-MCNC: 9.3 MG/DL (ref 8.7–10.5)
CHLORIDE SERPL-SCNC: 106 MMOL/L (ref 95–110)
CHOLEST SERPL-MCNC: 170 MG/DL (ref 120–199)
CHOLEST/HDLC SERPL: 2.6 {RATIO} (ref 2–5)
CO2 SERPL-SCNC: 27 MMOL/L (ref 23–29)
CREAT SERPL-MCNC: 0.52 MG/DL (ref 0.5–1.4)
DIFFERENTIAL METHOD: NORMAL
EOSINOPHIL # BLD AUTO: 0.1 K/UL (ref 0–0.5)
EOSINOPHIL NFR BLD: 1.2 % (ref 0–8)
ERYTHROCYTE [DISTWIDTH] IN BLOOD BY AUTOMATED COUNT: 11.8 % (ref 11.5–14.5)
EST. GFR  (NO RACE VARIABLE): >60 ML/MIN/1.73 M^2
ESTIMATED AVG GLUCOSE: 88 MG/DL (ref 68–131)
GLUCOSE SERPL-MCNC: 83 MG/DL (ref 70–110)
HBA1C MFR BLD: 4.7 % (ref 4–5.6)
HCT VFR BLD AUTO: 40 % (ref 37–48.5)
HDLC SERPL-MCNC: 66 MG/DL (ref 40–75)
HDLC SERPL: 38.8 % (ref 20–50)
HGB BLD-MCNC: 13.1 G/DL (ref 12–16)
IMM GRANULOCYTES # BLD AUTO: 0.01 K/UL (ref 0–0.04)
IMM GRANULOCYTES NFR BLD AUTO: 0.2 % (ref 0–0.5)
LDLC SERPL CALC-MCNC: 89.8 MG/DL (ref 63–159)
LYMPHOCYTES # BLD AUTO: 1.7 K/UL (ref 1–4.8)
LYMPHOCYTES NFR BLD: 33.9 % (ref 18–48)
MCH RBC QN AUTO: 28.4 PG (ref 27–31)
MCHC RBC AUTO-ENTMCNC: 32.8 G/DL (ref 32–36)
MCV RBC AUTO: 87 FL (ref 82–98)
MONOCYTES # BLD AUTO: 0.4 K/UL (ref 0.3–1)
MONOCYTES NFR BLD: 7.2 % (ref 4–15)
NEUTROPHILS # BLD AUTO: 2.9 K/UL (ref 1.8–7.7)
NEUTROPHILS NFR BLD: 56.9 % (ref 38–73)
NONHDLC SERPL-MCNC: 104 MG/DL
NRBC BLD-RTO: 0 /100 WBC
PLATELET # BLD AUTO: 287 K/UL (ref 150–450)
PMV BLD AUTO: 10.4 FL (ref 9.2–12.9)
POTASSIUM SERPL-SCNC: 4 MMOL/L (ref 3.5–5.1)
PROT SERPL-MCNC: 7.8 G/DL (ref 6–8.4)
RBC # BLD AUTO: 4.61 M/UL (ref 4–5.4)
SODIUM SERPL-SCNC: 142 MMOL/L (ref 136–145)
T4 FREE SERPL-MCNC: 0.97 NG/DL (ref 0.71–1.51)
TRIGL SERPL-MCNC: 71 MG/DL (ref 30–150)
TSH SERPL DL<=0.005 MIU/L-ACNC: 1.19 UIU/ML (ref 0.4–4)
UUN UR-MCNC: 9 MG/DL (ref 7–17)
VALPROATE SERPL-MCNC: <10 UG/ML (ref 50–100)
WBC # BLD AUTO: 5.02 K/UL (ref 3.9–12.7)

## 2022-10-27 PROCEDURE — 99214 OFFICE O/P EST MOD 30 MIN: CPT | Mod: S$GLB,,, | Performed by: STUDENT IN AN ORGANIZED HEALTH CARE EDUCATION/TRAINING PROGRAM

## 2022-10-27 PROCEDURE — 80053 COMPREHEN METABOLIC PANEL: CPT | Mod: PO | Performed by: PSYCHIATRY & NEUROLOGY

## 2022-10-27 PROCEDURE — 80164 ASSAY DIPROPYLACETIC ACD TOT: CPT | Mod: PO | Performed by: PSYCHIATRY & NEUROLOGY

## 2022-10-27 PROCEDURE — 1160F RVW MEDS BY RX/DR IN RCRD: CPT | Mod: CPTII,S$GLB,, | Performed by: STUDENT IN AN ORGANIZED HEALTH CARE EDUCATION/TRAINING PROGRAM

## 2022-10-27 PROCEDURE — 90686 IIV4 VACC NO PRSV 0.5 ML IM: CPT | Mod: S$GLB,,, | Performed by: STUDENT IN AN ORGANIZED HEALTH CARE EDUCATION/TRAINING PROGRAM

## 2022-10-27 PROCEDURE — 99214 PR OFFICE/OUTPT VISIT, EST, LEVL IV, 30-39 MIN: ICD-10-PCS | Mod: S$GLB,,, | Performed by: STUDENT IN AN ORGANIZED HEALTH CARE EDUCATION/TRAINING PROGRAM

## 2022-10-27 PROCEDURE — G0008 FLU VACCINE (QUAD) GREATER THAN OR EQUAL TO 3YO PRESERVATIVE FREE IM: ICD-10-PCS | Mod: S$GLB,,, | Performed by: STUDENT IN AN ORGANIZED HEALTH CARE EDUCATION/TRAINING PROGRAM

## 2022-10-27 PROCEDURE — G0008 ADMIN INFLUENZA VIRUS VAC: HCPCS | Mod: S$GLB,,, | Performed by: STUDENT IN AN ORGANIZED HEALTH CARE EDUCATION/TRAINING PROGRAM

## 2022-10-27 PROCEDURE — 3075F PR MOST RECENT SYSTOLIC BLOOD PRESS GE 130-139MM HG: ICD-10-PCS | Mod: CPTII,S$GLB,, | Performed by: STUDENT IN AN ORGANIZED HEALTH CARE EDUCATION/TRAINING PROGRAM

## 2022-10-27 PROCEDURE — 90686 FLU VACCINE (QUAD) GREATER THAN OR EQUAL TO 3YO PRESERVATIVE FREE IM: ICD-10-PCS | Mod: S$GLB,,, | Performed by: STUDENT IN AN ORGANIZED HEALTH CARE EDUCATION/TRAINING PROGRAM

## 2022-10-27 PROCEDURE — 3044F HG A1C LEVEL LT 7.0%: CPT | Mod: CPTII,S$GLB,, | Performed by: STUDENT IN AN ORGANIZED HEALTH CARE EDUCATION/TRAINING PROGRAM

## 2022-10-27 PROCEDURE — 1160F PR REVIEW ALL MEDS BY PRESCRIBER/CLIN PHARMACIST DOCUMENTED: ICD-10-PCS | Mod: CPTII,S$GLB,, | Performed by: STUDENT IN AN ORGANIZED HEALTH CARE EDUCATION/TRAINING PROGRAM

## 2022-10-27 PROCEDURE — 3079F DIAST BP 80-89 MM HG: CPT | Mod: CPTII,S$GLB,, | Performed by: STUDENT IN AN ORGANIZED HEALTH CARE EDUCATION/TRAINING PROGRAM

## 2022-10-27 PROCEDURE — 36415 COLL VENOUS BLD VENIPUNCTURE: CPT | Mod: PO | Performed by: PSYCHIATRY & NEUROLOGY

## 2022-10-27 PROCEDURE — 1159F MED LIST DOCD IN RCRD: CPT | Mod: CPTII,S$GLB,, | Performed by: STUDENT IN AN ORGANIZED HEALTH CARE EDUCATION/TRAINING PROGRAM

## 2022-10-27 PROCEDURE — 84443 ASSAY THYROID STIM HORMONE: CPT | Mod: PO | Performed by: PSYCHIATRY & NEUROLOGY

## 2022-10-27 PROCEDURE — 85025 COMPLETE CBC W/AUTO DIFF WBC: CPT | Mod: PO | Performed by: PSYCHIATRY & NEUROLOGY

## 2022-10-27 PROCEDURE — 3075F SYST BP GE 130 - 139MM HG: CPT | Mod: CPTII,S$GLB,, | Performed by: STUDENT IN AN ORGANIZED HEALTH CARE EDUCATION/TRAINING PROGRAM

## 2022-10-27 PROCEDURE — 3079F PR MOST RECENT DIASTOLIC BLOOD PRESSURE 80-89 MM HG: ICD-10-PCS | Mod: CPTII,S$GLB,, | Performed by: STUDENT IN AN ORGANIZED HEALTH CARE EDUCATION/TRAINING PROGRAM

## 2022-10-27 PROCEDURE — 80061 LIPID PANEL: CPT | Performed by: PSYCHIATRY & NEUROLOGY

## 2022-10-27 PROCEDURE — 1159F PR MEDICATION LIST DOCUMENTED IN MEDICAL RECORD: ICD-10-PCS | Mod: CPTII,S$GLB,, | Performed by: STUDENT IN AN ORGANIZED HEALTH CARE EDUCATION/TRAINING PROGRAM

## 2022-10-27 PROCEDURE — 83036 HEMOGLOBIN GLYCOSYLATED A1C: CPT | Performed by: PSYCHIATRY & NEUROLOGY

## 2022-10-27 PROCEDURE — 3044F PR MOST RECENT HEMOGLOBIN A1C LEVEL <7.0%: ICD-10-PCS | Mod: CPTII,S$GLB,, | Performed by: STUDENT IN AN ORGANIZED HEALTH CARE EDUCATION/TRAINING PROGRAM

## 2022-10-27 PROCEDURE — 84439 ASSAY OF FREE THYROXINE: CPT | Performed by: PSYCHIATRY & NEUROLOGY

## 2022-10-27 RX ORDER — DIAZEPAM 2 MG/1
2 TABLET ORAL ONCE
Qty: 1 TABLET | Refills: 0 | Status: SHIPPED | OUTPATIENT
Start: 2022-10-27 | End: 2023-05-18

## 2022-10-29 NOTE — PROGRESS NOTES
Patient ID: Kenzie Saunders is a 47 y.o. female.     Chief Complaint: Hypertension    Back Pain  This is a recurrent problem. The current episode started more than 1 month ago. The problem occurs daily. The problem is unchanged. The pain is present in the lumbar spine. The quality of the pain is described as aching. The pain does not radiate. The pain is at a severity of 4/10. The pain is moderate. The symptoms are aggravated by position. Associated symptoms include leg pain and numbness. Pertinent negatives include no bladder incontinence, bowel incontinence, chest pain, fever, headaches, perianal numbness, tingling or weakness.     Patient reports that her leg gives out when walking causing her to fall. 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   HENT:  Negative for ear pain and sinus pain.    Eyes:  Negative for discharge.   Respiratory:  Negative for cough and shortness of breath.    Cardiovascular:  Negative for chest pain and leg swelling.   Gastrointestinal:  Negative for bowel incontinence, diarrhea, nausea and vomiting.   Genitourinary:  Negative for bladder incontinence and urgency.   Musculoskeletal:  Positive for back pain. Negative for myalgias.   Skin:  Negative for rash.   Neurological:  Positive for numbness. Negative for tingling, weakness and headaches.   Psychiatric/Behavioral:  Negative for depression.    All other systems reviewed and are negative.    Currently Medications  Current Outpatient Medications on File Prior to Visit   Medication Sig Dispense Refill    atorvastatin (LIPITOR) 10 MG tablet Take 1 tablet (10 mg total) by mouth once daily. 90 tablet 3    divalproex ER (DEPAKOTE) 500 MG Tb24 Take 1,000 mg by mouth nightly.      escitalopram oxalate (LEXAPRO ORAL) Take 25 mg by mouth.       traZODone (DESYREL) 100 MG tablet Take 100 mg by mouth nightly.      VRAYLAR  "3 mg Cap Take 1 capsule by mouth once daily.       No current facility-administered medications on file prior to visit.       Physical  Exam  Vitals:    10/27/22 0949   BP: 138/86   BP Location: Right arm   Patient Position: Sitting   Pulse: 94   SpO2: 99%   Weight: 65 kg (143 lb 4.8 oz)   Height: 5' 2" (1.575 m)      Body mass index is 26.21 kg/m².    Physical Exam  Vitals and nursing note reviewed.   Constitutional:       General: She is not in acute distress.     Appearance: She is not ill-appearing.   HENT:      Head: Normocephalic and atraumatic.      Right Ear: External ear normal.      Left Ear: External ear normal.      Nose: Nose normal.      Mouth/Throat:      Mouth: Mucous membranes are moist.   Eyes:      Extraocular Movements: Extraocular movements intact.      Conjunctiva/sclera: Conjunctivae normal.   Cardiovascular:      Rate and Rhythm: Normal rate and regular rhythm.      Pulses: Normal pulses.      Heart sounds: No murmur heard.  Pulmonary:      Effort: Pulmonary effort is normal. No respiratory distress.      Breath sounds: No wheezing.   Abdominal:      General: There is no distension.      Palpations: Abdomen is soft. There is no mass.      Tenderness: There is no abdominal tenderness.   Musculoskeletal:         General: No swelling.      Cervical back: Normal range of motion.   Skin:     Coloration: Skin is not jaundiced.      Findings: No rash.   Neurological:      General: No focal deficit present.      Mental Status: She is alert and oriented to person, place, and time.   Psychiatric:         Mood and Affect: Mood normal.         Thought Content: Thought content normal.       Labs:    Complete Blood Count  Lab Results   Component Value Date    RBC 4.61 10/27/2022    HGB 13.1 10/27/2022    HCT 40.0 10/27/2022    MCV 87 10/27/2022    MCH 28.4 10/27/2022    MCHC 32.8 10/27/2022    RDW 11.8 10/27/2022     10/27/2022    MPV 10.4 10/27/2022    GRAN 2.9 10/27/2022    GRAN 56.9 10/27/2022    " LYMPH 1.7 10/27/2022    LYMPH 33.9 10/27/2022    MONO 0.4 10/27/2022    MONO 7.2 10/27/2022    EOS 0.1 10/27/2022    BASO 0.03 10/27/2022    EOSINOPHIL 1.2 10/27/2022    BASOPHIL 0.6 10/27/2022    DIFFMETHOD Automated 10/27/2022       Comprehensive Metabolic Panel  Lab Results   Component Value Date    GLU 83 10/27/2022    BUN 9 10/27/2022    CREATININE 0.52 10/27/2022     10/27/2022    K 4.0 10/27/2022     10/27/2022    PROT 7.8 10/27/2022    ALBUMIN 4.5 10/27/2022    BILITOT 0.8 10/27/2022    AST 51 (H) 10/27/2022    ALKPHOS 82 10/27/2022    CO2 27 10/27/2022    ALT 20 10/27/2022    ANIONGAP 9 10/27/2022       TSH  Lab Results   Component Value Date    TSH 1.190 10/27/2022       Imaging:  Echo  · The left ventricle is normal in size with concentric remodeling and   normal systolic function.  · The estimated ejection fraction is 60%.  · Normal left ventricular diastolic function.  · The estimated PA systolic pressure is 13 mmHg.  · With normal right ventricular systolic function.  · Normal central venous pressure (3 mmHg).         Assessment/Plan:    Problem List Items Addressed This Visit          Neuro    Neurofibromatosis, type I (von Recklinghausen's disease)    Relevant Orders    Ambulatory referral/consult to Neurology     Other Visit Diagnoses       Dorsalgia, unspecified    -  Primary    Relevant Orders    MRI Lumbar Spine Without Contrast    Screening mammogram for high-risk patient        Relevant Orders    Mammo Digital Screening Bilat w/ Tom    Colon cancer screening        Need for influenza vaccination        Anxiety             Valium prescribed to help with anxiety associated with MRI    Discussed how to stay healthy including: diet, exercise, refraining from smoking and discussed screening exams / tests needed for age, sex and family Hx.      Duong Dixon MD

## 2022-10-31 ENCOUNTER — PATIENT MESSAGE (OUTPATIENT)
Dept: FAMILY MEDICINE | Facility: CLINIC | Age: 47
End: 2022-10-31
Payer: MEDICAID

## 2022-10-31 ENCOUNTER — PATIENT MESSAGE (OUTPATIENT)
Dept: ADMINISTRATIVE | Facility: HOSPITAL | Age: 47
End: 2022-10-31
Payer: MEDICAID

## 2022-10-31 DIAGNOSIS — Z12.11 SCREENING FOR COLON CANCER: ICD-10-CM

## 2022-11-03 ENCOUNTER — TELEPHONE (OUTPATIENT)
Dept: FAMILY MEDICINE | Facility: CLINIC | Age: 47
End: 2022-11-03
Payer: MEDICAID

## 2022-11-10 ENCOUNTER — TELEPHONE (OUTPATIENT)
Dept: FAMILY MEDICINE | Facility: CLINIC | Age: 47
End: 2022-11-10
Payer: MEDICAID

## 2022-11-17 ENCOUNTER — HOSPITAL ENCOUNTER (OUTPATIENT)
Dept: RADIOLOGY | Facility: HOSPITAL | Age: 47
Discharge: HOME | End: 2022-11-17
Attending: STUDENT IN AN ORGANIZED HEALTH CARE EDUCATION/TRAINING PROGRAM
Payer: MEDICARE

## 2022-11-17 DIAGNOSIS — Z12.31 SCREENING MAMMOGRAM FOR HIGH-RISK PATIENT: ICD-10-CM

## 2022-11-17 PROCEDURE — 77067 SCR MAMMO BI INCL CAD: CPT | Mod: TC,PO

## 2022-11-17 PROCEDURE — 77063 BREAST TOMOSYNTHESIS BI: CPT | Mod: TC,PO

## 2022-11-19 LAB — HEMOCCULT STL QL IA: NEGATIVE

## 2022-11-29 ENCOUNTER — TELEPHONE (OUTPATIENT)
Dept: FAMILY MEDICINE | Facility: CLINIC | Age: 47
End: 2022-11-29
Payer: MEDICAID

## 2022-12-08 ENCOUNTER — TELEPHONE (OUTPATIENT)
Dept: OBSTETRICS AND GYNECOLOGY | Facility: CLINIC | Age: 47
End: 2022-12-08
Payer: MEDICAID

## 2022-12-08 NOTE — TELEPHONE ENCOUNTER
Contacted pt and informed Dr. Chavez will be seeing patients in [place] instead of [place] on 12/27/22. Patient verbalized understanding. Pt states she would prefer another day in [place]. Pt rescheduled for 1/3/23 at 1415.

## 2023-01-09 ENCOUNTER — TELEPHONE (OUTPATIENT)
Dept: NEUROLOGY | Facility: CLINIC | Age: 48
End: 2023-01-09

## 2023-01-09 ENCOUNTER — TELEPHONE (OUTPATIENT)
Dept: FAMILY MEDICINE | Facility: CLINIC | Age: 48
End: 2023-01-09

## 2023-01-11 ENCOUNTER — PES CALL (OUTPATIENT)
Dept: ADMINISTRATIVE | Facility: OTHER | Age: 48
End: 2023-01-11

## 2023-01-25 ENCOUNTER — TELEPHONE (OUTPATIENT)
Dept: FAMILY MEDICINE | Facility: CLINIC | Age: 48
End: 2023-01-25

## 2023-02-17 ENCOUNTER — HOSPITAL ENCOUNTER (EMERGENCY)
Facility: HOSPITAL | Age: 48
Discharge: HOME | End: 2023-02-17
Attending: EMERGENCY MEDICINE
Payer: MEDICARE

## 2023-02-17 VITALS
OXYGEN SATURATION: 100 % | TEMPERATURE: 99 F | BODY MASS INDEX: 26.31 KG/M2 | DIASTOLIC BLOOD PRESSURE: 78 MMHG | HEIGHT: 62 IN | RESPIRATION RATE: 18 BRPM | WEIGHT: 143 LBS | SYSTOLIC BLOOD PRESSURE: 138 MMHG | HEART RATE: 59 BPM

## 2023-02-17 DIAGNOSIS — R07.9 CHEST PAIN, UNSPECIFIED TYPE: Primary | ICD-10-CM

## 2023-02-17 DIAGNOSIS — R07.9 CHEST PAIN: ICD-10-CM

## 2023-02-17 LAB
ALBUMIN SERPL BCP-MCNC: 3.9 G/DL (ref 3.5–5.2)
ALP SERPL-CCNC: 79 U/L (ref 38–126)
ALT SERPL W/O P-5'-P-CCNC: 14 U/L (ref 10–44)
ANION GAP SERPL CALC-SCNC: 4 MMOL/L (ref 8–16)
AST SERPL-CCNC: 20 U/L (ref 15–46)
BASOPHILS # BLD AUTO: 0.03 K/UL (ref 0–0.2)
BASOPHILS NFR BLD: 0.7 % (ref 0–1.9)
BILIRUB SERPL-MCNC: 0.5 MG/DL (ref 0.1–1)
CALCIUM SERPL-MCNC: 8.1 MG/DL (ref 8.7–10.5)
CHLORIDE SERPL-SCNC: 108 MMOL/L (ref 95–110)
CO2 SERPL-SCNC: 27 MMOL/L (ref 23–29)
CREAT SERPL-MCNC: 0.54 MG/DL (ref 0.5–1.4)
DIFFERENTIAL METHOD: ABNORMAL
EOSINOPHIL # BLD AUTO: 0.2 K/UL (ref 0–0.5)
EOSINOPHIL NFR BLD: 3.7 % (ref 0–8)
ERYTHROCYTE [DISTWIDTH] IN BLOOD BY AUTOMATED COUNT: 11.4 % (ref 11.5–14.5)
EST. GFR  (NO RACE VARIABLE): >60 ML/MIN/1.73 M^2
GLUCOSE SERPL-MCNC: 91 MG/DL (ref 70–110)
HCT VFR BLD AUTO: 37.5 % (ref 37–48.5)
HGB BLD-MCNC: 12.4 G/DL (ref 12–16)
IMM GRANULOCYTES # BLD AUTO: 0 K/UL (ref 0–0.04)
IMM GRANULOCYTES NFR BLD AUTO: 0 % (ref 0–0.5)
LYMPHOCYTES # BLD AUTO: 1.4 K/UL (ref 1–4.8)
LYMPHOCYTES NFR BLD: 32.6 % (ref 18–48)
MCH RBC QN AUTO: 28.2 PG (ref 27–31)
MCHC RBC AUTO-ENTMCNC: 33.1 G/DL (ref 32–36)
MCV RBC AUTO: 85 FL (ref 82–98)
MONOCYTES # BLD AUTO: 0.5 K/UL (ref 0.3–1)
MONOCYTES NFR BLD: 10.6 % (ref 4–15)
NEUTROPHILS # BLD AUTO: 2.3 K/UL (ref 1.8–7.7)
NEUTROPHILS NFR BLD: 52.4 % (ref 38–73)
NRBC BLD-RTO: 0 /100 WBC
PLATELET # BLD AUTO: 263 K/UL (ref 150–450)
PMV BLD AUTO: 10.1 FL (ref 9.2–12.9)
POTASSIUM SERPL-SCNC: 3.9 MMOL/L (ref 3.5–5.1)
PROT SERPL-MCNC: 6.9 G/DL (ref 6–8.4)
RBC # BLD AUTO: 4.39 M/UL (ref 4–5.4)
SODIUM SERPL-SCNC: 139 MMOL/L (ref 136–145)
TROPONIN I SERPL-MCNC: <0.012 NG/ML (ref 0.01–0.03)
UUN UR-MCNC: 6 MG/DL (ref 7–17)
WBC # BLD AUTO: 4.35 K/UL (ref 3.9–12.7)

## 2023-02-17 PROCEDURE — 99285 EMERGENCY DEPT VISIT HI MDM: CPT | Mod: 25,ER

## 2023-02-17 PROCEDURE — 80053 COMPREHEN METABOLIC PANEL: CPT | Mod: ER | Performed by: EMERGENCY MEDICINE

## 2023-02-17 PROCEDURE — 99900035 HC TECH TIME PER 15 MIN (STAT): Mod: ER

## 2023-02-17 PROCEDURE — 84484 ASSAY OF TROPONIN QUANT: CPT | Mod: ER | Performed by: EMERGENCY MEDICINE

## 2023-02-17 PROCEDURE — 93010 ELECTROCARDIOGRAM REPORT: CPT | Mod: ,,, | Performed by: INTERNAL MEDICINE

## 2023-02-17 PROCEDURE — 93010 EKG 12-LEAD: ICD-10-PCS | Mod: ,,, | Performed by: INTERNAL MEDICINE

## 2023-02-17 PROCEDURE — 93005 ELECTROCARDIOGRAM TRACING: CPT | Mod: ER

## 2023-02-17 PROCEDURE — 85025 COMPLETE CBC W/AUTO DIFF WBC: CPT | Mod: ER | Performed by: EMERGENCY MEDICINE

## 2023-02-17 NOTE — ED PROVIDER NOTES
NAME:  Kenzie Saunders  CSN:     [id number]  MRN:    [id number]  ADMIT DATE: 2/17/2023        eMERGENCY dEPARTMENT eNCOUnter    CHIEF COMPLAINT    Chief Complaint   Patient presents with    Chest Pain     Pt reports chest pain that started yesterday. Reports dizziness. Describes pain as tightness.       HPI      Kenzie Saunders is a 47 y.o. female who presents to the ED for not feeling well.  Patient reports dizziness and chest tightness this started yesterday.  She was seen in urgent care today until to come to the emergency room.  She reports her symptoms have resolved at this time.  She denies any fevers or coughing.          ALLERGIES    Review of patient's allergies indicates:   Allergen Reactions    Haloperidol lactate Hives    Prevacid [lansoprazole] Hives    Risperidone analogues Other (See Comments)     Elevates prolactin levels       PAST MEDICAL HISTORY  Past Medical History:   Diagnosis Date    Astigmatism     Bipolar 2 disorder     ECHO showing normal mitral valve 3/27/2017    GERD (gastroesophageal reflux disease)     Migraine headache     Neurofibromatosis, type 1 (von Recklinghausen's disease)     patient report MVP but ECHO showing normal mitral valve 3/27/2017    Sciatica        SURGICAL HISTORY    Past Surgical History:   Procedure Laterality Date    HYSTERECTOMY         SOCIAL HISTORY    Social History     Socioeconomic History    Marital status: Single   Tobacco Use    Smoking status: Never    Smokeless tobacco: Never   Substance and Sexual Activity    Alcohol use: No    Drug use: No    Sexual activity: Never       FAMILY HISTORY    Family History   Problem Relation Age of Onset    Neurofibromatosis Mother     Neurofibromatosis Maternal Aunt     Breast cancer Maternal Aunt     Neurofibromatosis Maternal Grandfather     Neurofibromatosis Cousin        REVIEW OF SYSTEMS   ROS  All Systems otherwise negative except as noted in the History of Present Illness.        PHYSICAL EXAM    Reviewed Triage  "Note  VITAL SIGNS:   ED Triage Vitals [02/17/23 1225]   Enc Vitals Group      /72      Pulse 64      Resp 20      Temp 98.6 °F (37 °C)      Temp src Oral      SpO2 99 %      Weight 143 lb      Height 5' 2"      Head Circumference       Peak Flow       Pain Score       Pain Loc       Pain Edu?       Excl. in GC?        Patient Vitals for the past 24 hrs:   BP Temp Temp src Pulse Resp SpO2 Height Weight   02/17/23 1310 137/80 -- -- (!) 58 16 100 % -- --   02/17/23 1225 134/72 98.6 °F (37 °C) Oral 64 20 99 % 5' 2" (1.575 m) 64.9 kg (143 lb)           Physical Exam    Constitutional:  Well-developed, well-nourished. No acute distress  HENT:  Normocephalic, atraumatic.  Eyes:  EOMI. Conjunctiva normal without discharge.   Neck: Normal range of motion.No stridor. No meningismus.   Respiratory:  Normal breath sounds bilaterally.  No respiratory distress, retractions, or conversational dyspnea. No wheezing. No rhonchi. No rales.   Cardiovascular:  Normal heart rate. Normal rhythm. No pitting lower extremity edema.   GI:  Abdomen soft, non-distended, non-tender. Normal bowel sounds. No guarding, rigidity or rebound.    : No CVA tenderness.   Musculoskeletal:  No gross deformity or limited range of motion of all major joints. No palpable bony deformity. No tenderness to palpation.  Integument:  Warm and dry. No rash.  Neurologic:  Normal motor function. Normal sensory function. No focal deficits noted. Alert and Interactive.  Psychiatric:  Affect normal. Mood normal.         LABS  Pertinent labs reviewed. (See chart for details)   Labs Reviewed   CBC W/ AUTO DIFFERENTIAL - Abnormal; Notable for the following components:       Result Value    RDW 11.4 (*)     All other components within normal limits   COMPREHENSIVE METABOLIC PANEL - Abnormal; Notable for the following components:    BUN 6 (*)     Calcium 8.1 (*)     Anion Gap 4 (*)     All other components within normal limits   TROPONIN I         RADIOLOGY    Imaging " Results              X-Ray Chest PA And Lateral (Final result)  Result time 02/17/23 12:55:46      Final result by Carson Nielsen MD (02/17/23 12:55:46)                   Impression:      No acute process seen.      Electronically signed by: Carsno Nielsen MD  Date:    02/17/2023  Time:    12:55               Narrative:    EXAMINATION:  XR CHEST PA AND LATERAL    CLINICAL HISTORY:  Chest pain, unspecified    COMPARISON:  None    FINDINGS:  Cardiac silhouette is normal.  The lungs demonstrate no evidence of active disease.  No evidence of pleural effusion or pneumothorax.  Bones appear intact.   Several soft tissue calcifications are seen along the left chest wall and possibly left breast.                                      PROCEDURES    Procedures      EKG     Interpreted by ERP:     EKG Readings: (Independently Interpreted)   Rhythm: Normal Sinus Rhythm. Heart Rate: 64. Ectopy: No Ectopy. Conduction: Normal. ST Segments: Normal ST Segments. T Waves: Normal. Clinical Impression: Normal Sinus Rhythm     ED COURSE & MEDICAL DECISION MAKING    Pertinent & Imaging studies reviewed. (See chart for details and specific orders.)        Medications - No data to display       Normal workup.  Patient's symptoms have resolved.  Advised to follow-up with PCP       DISPOSITION  Patient discharged in stable condition at No discharge date for patient encounter.      DISCHARGE INSTRUCTIONS & MEDS    @DISCHARGEMEDSLIST(<NOROUTINE> error)@      New Prescriptions    No medications on file           FINAL IMPRESSION    1. Chest pain, unspecified type    2. Chest pain              Blood Pressure Follow-Up Advised  Patient advised to follow up with PCP within 3-5 days for blood pressure re-check if blood pressure is equal to or greater than 120/80.         Critical care time spent with this patient (not including separately billable items) was  0 minutes.     DISCLAIMER: This note was prepared with Dragon NaturallySpeaking voice  recognition transcription software. Garbled syntax, mangled pronouns, and other bizarre constructions may be attributed to that software system.      Hiram Andres MD  02/17/2023  2:02 PM           Hiram Andres MD  02/17/23 2713

## 2023-02-22 ENCOUNTER — TELEPHONE (OUTPATIENT)
Dept: ADMINISTRATIVE | Facility: CLINIC | Age: 48
End: 2023-02-22

## 2023-02-22 NOTE — TELEPHONE ENCOUNTER
Called pt; no answer; could not leave a message due to line kept ringing; I was calling to confirm pt's in office [facility] appt on 2/24/23

## 2023-02-23 ENCOUNTER — OFFICE VISIT (OUTPATIENT)
Dept: CARDIOLOGY | Facility: CLINIC | Age: 48
End: 2023-02-23
Payer: MEDICARE

## 2023-02-23 VITALS
HEIGHT: 62 IN | SYSTOLIC BLOOD PRESSURE: 125 MMHG | DIASTOLIC BLOOD PRESSURE: 82 MMHG | WEIGHT: 144 LBS | BODY MASS INDEX: 26.5 KG/M2 | HEART RATE: 71 BPM | OXYGEN SATURATION: 100 %

## 2023-02-23 DIAGNOSIS — R07.9 CHEST PAIN, UNSPECIFIED TYPE: Primary | ICD-10-CM

## 2023-02-23 DIAGNOSIS — I10 ESSENTIAL HYPERTENSION: ICD-10-CM

## 2023-02-23 DIAGNOSIS — E78.5 HYPERLIPIDEMIA, UNSPECIFIED HYPERLIPIDEMIA TYPE: ICD-10-CM

## 2023-02-23 PROCEDURE — 3008F BODY MASS INDEX DOCD: CPT | Mod: CPTII,S$GLB,, | Performed by: INTERNAL MEDICINE

## 2023-02-23 PROCEDURE — 99214 PR OFFICE/OUTPT VISIT, EST, LEVL IV, 30-39 MIN: ICD-10-PCS | Mod: S$GLB,,, | Performed by: INTERNAL MEDICINE

## 2023-02-23 PROCEDURE — 99999 PR PBB SHADOW E&M-EST. PATIENT-LVL II: CPT | Mod: PBBFAC,,, | Performed by: INTERNAL MEDICINE

## 2023-02-23 PROCEDURE — 99999 PR PBB SHADOW E&M-EST. PATIENT-LVL II: ICD-10-PCS | Mod: PBBFAC,,, | Performed by: INTERNAL MEDICINE

## 2023-02-23 PROCEDURE — 1160F RVW MEDS BY RX/DR IN RCRD: CPT | Mod: CPTII,S$GLB,, | Performed by: INTERNAL MEDICINE

## 2023-02-23 PROCEDURE — 3079F PR MOST RECENT DIASTOLIC BLOOD PRESSURE 80-89 MM HG: ICD-10-PCS | Mod: CPTII,S$GLB,, | Performed by: INTERNAL MEDICINE

## 2023-02-23 PROCEDURE — 3079F DIAST BP 80-89 MM HG: CPT | Mod: CPTII,S$GLB,, | Performed by: INTERNAL MEDICINE

## 2023-02-23 PROCEDURE — 3008F PR BODY MASS INDEX (BMI) DOCUMENTED: ICD-10-PCS | Mod: CPTII,S$GLB,, | Performed by: INTERNAL MEDICINE

## 2023-02-23 PROCEDURE — 3074F PR MOST RECENT SYSTOLIC BLOOD PRESSURE < 130 MM HG: ICD-10-PCS | Mod: CPTII,S$GLB,, | Performed by: INTERNAL MEDICINE

## 2023-02-23 PROCEDURE — 3074F SYST BP LT 130 MM HG: CPT | Mod: CPTII,S$GLB,, | Performed by: INTERNAL MEDICINE

## 2023-02-23 PROCEDURE — 1159F MED LIST DOCD IN RCRD: CPT | Mod: CPTII,S$GLB,, | Performed by: INTERNAL MEDICINE

## 2023-02-23 PROCEDURE — 1159F PR MEDICATION LIST DOCUMENTED IN MEDICAL RECORD: ICD-10-PCS | Mod: CPTII,S$GLB,, | Performed by: INTERNAL MEDICINE

## 2023-02-23 PROCEDURE — 99214 OFFICE O/P EST MOD 30 MIN: CPT | Mod: S$GLB,,, | Performed by: INTERNAL MEDICINE

## 2023-02-23 PROCEDURE — 1160F PR REVIEW ALL MEDS BY PRESCRIBER/CLIN PHARMACIST DOCUMENTED: ICD-10-PCS | Mod: CPTII,S$GLB,, | Performed by: INTERNAL MEDICINE

## 2023-02-23 NOTE — PROGRESS NOTES
Cardiology Clinic note    Subjective:   Patient ID:  Kenzie Saunders is a 47 y.o. female who presents for HTN FUP    HPI:   CP: Had an episode of CP with an anxiety attack for which she came to the ED. Trop neg x1. No recurrent episodes. Walks for about 30 mins every day. No exertional symptoms.    HTN: Not on medications. Salt restriction.    HLD: Tolerating statin    SH Tobacco Never used  FH Father had Mi at age 36    Patient Active Problem List    Diagnosis Date Noted    Abnormal ankle brachial index (NABILA) 03/09/2021    Chest pain at rest 11/03/2020    Palpitations 11/03/2020    PADGETT (dyspnea on exertion) 11/03/2020    Essential hypertension 11/03/2020    Dyslipidemia 11/03/2020    Family history of heart disease 11/03/2020    Anxiety about health 11/03/2020    Overweight (BMI 25.0-29.9) 11/03/2020    Undifferentiated schizophrenia 07/02/2018    Family history of breast cancer in sister 04/27/2017    Pure hypercholesterolemia 03/27/2017    Neurofibromatosis, type I (von Recklinghausen's disease) 08/24/2016    GERD (gastroesophageal reflux disease) 08/24/2016    Bipolar disorder 08/24/2016       Patient's Medications   New Prescriptions    No medications on file   Previous Medications    ATORVASTATIN (LIPITOR) 10 MG TABLET    Take 1 tablet (10 mg total) by mouth once daily.    DIAZEPAM (VALIUM) 2 MG TABLET    Take 1 tablet (2 mg total) by mouth once. Take an hour prior to procedure for 1 dose    DIVALPROEX ER (DEPAKOTE) 500 MG TB24    Take 1,000 mg by mouth nightly.    ESCITALOPRAM OXALATE (LEXAPRO ORAL)    Take 25 mg by mouth.     TRAZODONE (DESYREL) 100 MG TABLET    Take 100 mg by mouth nightly.    VRAYLAR 3 MG CAP    Take 1 capsule by mouth once daily.   Modified Medications    No medications on file   Discontinued Medications    No medications on file        Review of Systems   Constitutional: Negative for fever.   HENT:  Negative for nosebleeds.    Cardiovascular:  Negative for chest pain, dyspnea on  "exertion and leg swelling.        As above   Respiratory:  Negative for hemoptysis.    Hematologic/Lymphatic: Negative for bleeding problem.   Skin:  Negative for poor wound healing.   Gastrointestinal:  Negative for hematochezia.   Genitourinary:  Negative for hematuria.   Neurological:  Negative for light-headedness.   Allergic/Immunologic: Negative for persistent infections.       Objective:   Vitals  Vitals:    02/23/23 1129   BP: 125/82   Pulse: 71   SpO2: 100%   Weight: 65.3 kg (144 lb)   Height: 5' 2" (1.575 m)          Physical Exam  Constitutional:       General: She is not in acute distress.     Appearance: She is well-developed. She is not diaphoretic.   HENT:      Head: Normocephalic.   Neck:      Vascular: No JVD.   Cardiovascular:      Rate and Rhythm: Normal rate and regular rhythm.      Heart sounds: No murmur heard.    No friction rub. No gallop.   Pulmonary:      Effort: Pulmonary effort is normal. No respiratory distress.      Breath sounds: Normal breath sounds.   Abdominal:      Palpations: Abdomen is soft.      Tenderness: There is no abdominal tenderness.   Musculoskeletal:         General: No swelling.      Cervical back: Normal range of motion.   Skin:     General: Skin is warm.   Neurological:      Mental Status: She is alert.   Psychiatric:         Mood and Affect: Mood normal.           Assessment:     1. Chest pain, unspecified type    2. Essential hypertension    3. Hyperlipidemia, unspecified hyperlipidemia type        Plan:   Kenzie Saunders is a 47 y.o. female h/o HTN, HLD  Bipolar disorder  Neurofibromatosis type 1    - EKG personally reviewed. My interpretation:  2/17/23: SR 60s, normal axis. RBBB. Qtc 429  4/12/22: SR 80s, LAD. RBBB. PRWP. QTc 461  3/5/21: SR 60s, normal axis. IRBBB. QTc 441  - Echo April 2022  The left ventricle is normal in size with concentric remodeling and normal systolic function.  The estimated ejection fraction is 60%.  Normal left ventricular diastolic " function.  The estimated PA systolic pressure is 13 mmHg.  With normal right ventricular systolic function.  Normal central venous pressure (3 mmHg).  - Dobutamine echo stress test 2018  Negative for ischemia (EKG, echo). MPHR 97%.  - Echo 2018    1 - Normal left ventricular systolic function (EF 55-60%).     2 - Normal left ventricular diastolic function.     3 - Normal right ventricular systolic function .   - Holter 2020  Sinus rhythm throughout this recording period  Rare PACs and PVCs  Monitoring.  25 hours, monitor came off after that time  Episode of tachycardia heart rate 150 beats per minute 1300 hours, rhythm suggest possible atrial flutter ( versus sinus tachycardia)  No pauses or periods of significant bradycardia  No diary entries returned  No atrial fibrillation seen  - NABILA March 2021  No evidence of infrainguinal PAD bilaterally.  Tri- and biphasic waveforms throughout.  Normal NABILA bilaterally.    CP  Reports the ED episode happened in the setting of an anxiety attack  No recurrent symptoms  Description not c/w angina  Discussed symptoms to watch for and call  Persistent or worsening symptoms, come to the ED    HTN  BP well controlled  Salt restriction    HLD  Lab Results   Component Value Date    LDLCALC 89.8 10/27/2022   Statin as above      Continue with current medical plan and lifestyle changes.    No orders of the defined types were placed in this encounter.      Follow up PRN  Return sooner for concerns or questions. If symptoms persist go to the ED    She expressed verbal understanding and agreed with the plan      Annabelle Gaytan MD  Interventional Cardiology  [facility]  Phone: [phone]

## 2023-03-04 ENCOUNTER — PATIENT MESSAGE (OUTPATIENT)
Dept: ADMINISTRATIVE | Facility: OTHER | Age: 48
End: 2023-03-04

## 2023-03-16 ENCOUNTER — OFFICE VISIT (OUTPATIENT)
Dept: FAMILY MEDICINE | Facility: CLINIC | Age: 48
End: 2023-03-16
Payer: MEDICARE

## 2023-03-16 VITALS
WEIGHT: 142.31 LBS | BODY MASS INDEX: 26.19 KG/M2 | DIASTOLIC BLOOD PRESSURE: 84 MMHG | OXYGEN SATURATION: 98 % | SYSTOLIC BLOOD PRESSURE: 130 MMHG | HEART RATE: 110 BPM | HEIGHT: 62 IN

## 2023-03-16 DIAGNOSIS — J02.9 PHARYNGITIS, UNSPECIFIED ETIOLOGY: Primary | ICD-10-CM

## 2023-03-16 DIAGNOSIS — R05.9 COUGH, UNSPECIFIED TYPE: ICD-10-CM

## 2023-03-16 DIAGNOSIS — I10 ESSENTIAL HYPERTENSION: ICD-10-CM

## 2023-03-16 LAB
CTP QC/QA: YES
SARS-COV-2 RDRP RESP QL NAA+PROBE: NEGATIVE

## 2023-03-16 PROCEDURE — 1160F PR REVIEW ALL MEDS BY PRESCRIBER/CLIN PHARMACIST DOCUMENTED: ICD-10-PCS | Mod: CPTII,S$GLB,, | Performed by: STUDENT IN AN ORGANIZED HEALTH CARE EDUCATION/TRAINING PROGRAM

## 2023-03-16 PROCEDURE — 3079F DIAST BP 80-89 MM HG: CPT | Mod: CPTII,S$GLB,, | Performed by: STUDENT IN AN ORGANIZED HEALTH CARE EDUCATION/TRAINING PROGRAM

## 2023-03-16 PROCEDURE — 99214 PR OFFICE/OUTPT VISIT, EST, LEVL IV, 30-39 MIN: ICD-10-PCS | Mod: S$GLB,,, | Performed by: STUDENT IN AN ORGANIZED HEALTH CARE EDUCATION/TRAINING PROGRAM

## 2023-03-16 PROCEDURE — 87635: ICD-10-PCS | Mod: QW,S$GLB,, | Performed by: STUDENT IN AN ORGANIZED HEALTH CARE EDUCATION/TRAINING PROGRAM

## 2023-03-16 PROCEDURE — 3075F PR MOST RECENT SYSTOLIC BLOOD PRESS GE 130-139MM HG: ICD-10-PCS | Mod: CPTII,S$GLB,, | Performed by: STUDENT IN AN ORGANIZED HEALTH CARE EDUCATION/TRAINING PROGRAM

## 2023-03-16 PROCEDURE — 1160F RVW MEDS BY RX/DR IN RCRD: CPT | Mod: CPTII,S$GLB,, | Performed by: STUDENT IN AN ORGANIZED HEALTH CARE EDUCATION/TRAINING PROGRAM

## 2023-03-16 PROCEDURE — 3079F PR MOST RECENT DIASTOLIC BLOOD PRESSURE 80-89 MM HG: ICD-10-PCS | Mod: CPTII,S$GLB,, | Performed by: STUDENT IN AN ORGANIZED HEALTH CARE EDUCATION/TRAINING PROGRAM

## 2023-03-16 PROCEDURE — 3075F SYST BP GE 130 - 139MM HG: CPT | Mod: CPTII,S$GLB,, | Performed by: STUDENT IN AN ORGANIZED HEALTH CARE EDUCATION/TRAINING PROGRAM

## 2023-03-16 PROCEDURE — 3008F PR BODY MASS INDEX (BMI) DOCUMENTED: ICD-10-PCS | Mod: CPTII,S$GLB,, | Performed by: STUDENT IN AN ORGANIZED HEALTH CARE EDUCATION/TRAINING PROGRAM

## 2023-03-16 PROCEDURE — 1159F MED LIST DOCD IN RCRD: CPT | Mod: CPTII,S$GLB,, | Performed by: STUDENT IN AN ORGANIZED HEALTH CARE EDUCATION/TRAINING PROGRAM

## 2023-03-16 PROCEDURE — 1159F PR MEDICATION LIST DOCUMENTED IN MEDICAL RECORD: ICD-10-PCS | Mod: CPTII,S$GLB,, | Performed by: STUDENT IN AN ORGANIZED HEALTH CARE EDUCATION/TRAINING PROGRAM

## 2023-03-16 PROCEDURE — 3008F BODY MASS INDEX DOCD: CPT | Mod: CPTII,S$GLB,, | Performed by: STUDENT IN AN ORGANIZED HEALTH CARE EDUCATION/TRAINING PROGRAM

## 2023-03-16 PROCEDURE — 87635 SARS-COV-2 COVID-19 AMP PRB: CPT | Mod: QW,S$GLB,, | Performed by: STUDENT IN AN ORGANIZED HEALTH CARE EDUCATION/TRAINING PROGRAM

## 2023-03-16 PROCEDURE — 99214 OFFICE O/P EST MOD 30 MIN: CPT | Mod: S$GLB,,, | Performed by: STUDENT IN AN ORGANIZED HEALTH CARE EDUCATION/TRAINING PROGRAM

## 2023-04-10 NOTE — PROGRESS NOTES
Patient ID: Kenzie Saunders is a 47 y.o. female.     Chief Complaint: Cough    Cough  This is a new problem. The current episode started in the past 7 days. The problem has been unchanged. The cough is Non-productive. Associated symptoms include a fever, nasal congestion, postnasal drip and a sore throat. Pertinent negatives include no chest pain, chills, ear congestion, ear pain, headaches, myalgias, rash, shortness of breath, weight loss or wheezing. Nothing aggravates the symptoms. She has tried OTC cough suppressant for the symptoms. The treatment provided no relief.        Review of Systems  Review of Systems   Constitutional:  Positive for fever. Negative for chills and weight loss.   HENT:  Positive for postnasal drip and sore throat. Negative for ear pain and sinus pain.    Eyes:  Negative for discharge.   Respiratory:  Positive for cough. Negative for shortness of breath and wheezing.    Cardiovascular:  Negative for chest pain and leg swelling.   Gastrointestinal:  Negative for diarrhea, nausea and vomiting.   Genitourinary:  Negative for urgency.   Musculoskeletal:  Negative for myalgias.   Skin:  Negative for rash.   Neurological:  Negative for weakness and headaches.   Psychiatric/Behavioral:  Negative for depression.    All other systems reviewed and are negative.    Currently Medications  Current Outpatient Medications on File Prior to Visit   Medication Sig Dispense Refill    atorvastatin (LIPITOR) 10 MG tablet Take 1 tablet (10 mg total) by mouth once daily. 90 tablet 3    divalproex ER (DEPAKOTE) 500 MG Tb24 Take 1,000 mg by mouth nightly.      escitalopram oxalate (LEXAPRO ORAL) Take 25 mg by mouth.       traZODone (DESYREL) 100 MG tablet Take 100 mg by mouth nightly.      VRAYLAR 3 mg Cap Take 1 capsule by mouth once daily.      diazePAM (VALIUM) 2 MG tablet Take 1 tablet (2 mg total) by mouth once. Take an hour prior to procedure for 1 dose 1 tablet 0     No current facility-administered  "medications on file prior to visit.       Physical  Exam  Vitals:    03/16/23 1056   BP: 130/84   BP Location: Right arm   Patient Position: Sitting   Pulse: 110   SpO2: 98%   Weight: 64.5 kg (142 lb 4.9 oz)   Height: 5' 2" (1.575 m)      Body mass index is 26.03 kg/m².  Wt Readings from Last 3 Encounters:   03/16/23 64.5 kg (142 lb 4.9 oz)   02/23/23 65.3 kg (144 lb)   02/17/23 64.9 kg (143 lb)       Physical Exam  Vitals and nursing note reviewed.   Constitutional:       General: She is not in acute distress.     Appearance: She is not ill-appearing.   HENT:      Head: Normocephalic and atraumatic.      Right Ear: External ear normal.      Left Ear: External ear normal.      Nose: Nose normal.      Mouth/Throat:      Mouth: Mucous membranes are moist.      Pharynx: Posterior oropharyngeal erythema present.   Eyes:      Extraocular Movements: Extraocular movements intact.      Conjunctiva/sclera: Conjunctivae normal.   Cardiovascular:      Rate and Rhythm: Normal rate and regular rhythm.      Pulses: Normal pulses.      Heart sounds: No murmur heard.  Pulmonary:      Effort: Pulmonary effort is normal. No respiratory distress.      Breath sounds: No wheezing.   Abdominal:      General: There is no distension.      Palpations: Abdomen is soft. There is no mass.      Tenderness: There is no abdominal tenderness.   Musculoskeletal:         General: No swelling.      Cervical back: Normal range of motion.   Skin:     Coloration: Skin is not jaundiced.      Findings: No rash.   Neurological:      General: No focal deficit present.      Mental Status: She is alert and oriented to person, place, and time.   Psychiatric:         Mood and Affect: Mood normal.         Thought Content: Thought content normal.       Labs:    Complete Blood Count  Lab Results   Component Value Date    RBC 4.39 02/17/2023    HGB 12.4 02/17/2023    HCT 37.5 02/17/2023    MCV 85 02/17/2023    MCH 28.2 02/17/2023    MCHC 33.1 02/17/2023    RDW 11.4 " (L) 02/17/2023     02/17/2023    MPV 10.1 02/17/2023    GRAN 2.3 02/17/2023    GRAN 52.4 02/17/2023    LYMPH 1.4 02/17/2023    LYMPH 32.6 02/17/2023    MONO 0.5 02/17/2023    MONO 10.6 02/17/2023    EOS 0.2 02/17/2023    BASO 0.03 02/17/2023    EOSINOPHIL 3.7 02/17/2023    BASOPHIL 0.7 02/17/2023    DIFFMETHOD Automated 02/17/2023       Comprehensive Metabolic Panel  Lab Results   Component Value Date    GLU 91 02/17/2023    BUN 6 (L) 02/17/2023    CREATININE 0.54 02/17/2023     02/17/2023    K 3.9 02/17/2023     02/17/2023    PROT 6.9 02/17/2023    ALBUMIN 3.9 02/17/2023    BILITOT 0.5 02/17/2023    AST 20 02/17/2023    ALKPHOS 79 02/17/2023    CO2 27 02/17/2023    ALT 14 02/17/2023    ANIONGAP 4 (L) 02/17/2023       TSH  Lab Results   Component Value Date    TSH 1.190 10/27/2022       Imaging:  X-Ray Chest PA And Lateral  Narrative: EXAMINATION:  XR CHEST PA AND LATERAL    CLINICAL HISTORY:  Chest pain, unspecified    COMPARISON:  None    FINDINGS:  Cardiac silhouette is normal.  The lungs demonstrate no evidence of active disease.  No evidence of pleural effusion or pneumothorax.  Bones appear intact.   Several soft tissue calcifications are seen along the left chest wall and possibly left breast.  Impression: No acute process seen.    Electronically signed by: Carson Nielsen MD  Date:    02/17/2023  Time:    12:55      Assessment/Plan:    1. Pharyngitis, unspecified etiology  -     POCT Influenza A/B Molecular  -     POCT COVID-19 Rapid Screening    2. Essential hypertension    3. Cough, unspecified type  -     POCT Influenza A/B Molecular  -     POCT COVID-19 Rapid Screening         Discussed how to stay healthy including: diet, exercise, refraining from smoking and discussed screening exams / tests needed for age, sex and family Hx.        Duong Dixon MD

## 2023-04-19 ENCOUNTER — TELEPHONE (OUTPATIENT)
Dept: ADMINISTRATIVE | Facility: CLINIC | Age: 48
End: 2023-04-19
Payer: MEDICARE

## 2023-05-08 ENCOUNTER — OFFICE VISIT (OUTPATIENT)
Dept: FAMILY MEDICINE | Facility: CLINIC | Age: 48
End: 2023-05-08
Payer: MEDICARE

## 2023-05-08 DIAGNOSIS — L72.9 INFECTED CYST OF SKIN: ICD-10-CM

## 2023-05-08 DIAGNOSIS — Q85.01 NEUROFIBROMATOSIS, TYPE I (VON RECKLINGHAUSEN'S DISEASE): Primary | ICD-10-CM

## 2023-05-08 DIAGNOSIS — L08.9 INFECTED CYST OF SKIN: ICD-10-CM

## 2023-05-08 PROCEDURE — 1159F PR MEDICATION LIST DOCUMENTED IN MEDICAL RECORD: ICD-10-PCS | Mod: CPTII,95,, | Performed by: FAMILY MEDICINE

## 2023-05-08 PROCEDURE — 1159F MED LIST DOCD IN RCRD: CPT | Mod: CPTII,95,, | Performed by: FAMILY MEDICINE

## 2023-05-08 PROCEDURE — 1160F RVW MEDS BY RX/DR IN RCRD: CPT | Mod: CPTII,95,, | Performed by: FAMILY MEDICINE

## 2023-05-08 PROCEDURE — 99442 PR PHYSICIAN TELEPHONE EVALUATION 11-20 MIN: ICD-10-PCS | Mod: 95,,, | Performed by: FAMILY MEDICINE

## 2023-05-08 PROCEDURE — 99442 PR PHYSICIAN TELEPHONE EVALUATION 11-20 MIN: CPT | Mod: 95,,, | Performed by: FAMILY MEDICINE

## 2023-05-08 PROCEDURE — 1160F PR REVIEW ALL MEDS BY PRESCRIBER/CLIN PHARMACIST DOCUMENTED: ICD-10-PCS | Mod: CPTII,95,, | Performed by: FAMILY MEDICINE

## 2023-05-08 RX ORDER — PREDNISONE 10 MG/1
10 TABLET ORAL 2 TIMES DAILY
Qty: 10 TABLET | Refills: 0 | Status: SHIPPED | OUTPATIENT
Start: 2023-05-08 | End: 2023-05-13

## 2023-05-08 RX ORDER — DOXYCYCLINE 100 MG/1
100 CAPSULE ORAL 2 TIMES DAILY WITH MEALS
Qty: 20 CAPSULE | Refills: 0 | Status: SHIPPED | OUTPATIENT
Start: 2023-05-08 | End: 2023-05-18

## 2023-05-08 NOTE — PROGRESS NOTES
Answers submitted by the patient for this visit:  Back Pain Questionnaire (Submitted on 5/8/2023)  Chief Complaint: Back pain  Chronicity: chronic  Onset: in the past 7 days  Frequency: daily  Progression since onset: rapidly worsening  Pain location: gluteal  Pain quality: aching, shooting, stabbing  Radiates to: left foot, left knee, left thigh  Pain - numeric: 10/10  Pain is: the same all the time  Aggravated by: lying down, sitting, standing, stress  Stiffness is present: all day  abdominal pain: No  bladder incontinence: No  bowel incontinence: No  dysuria: No  fever: No  headaches: Yes  leg pain: Yes  numbness: Yes  paresis: No  paresthesias: Yes  pelvic pain: Yes  perianal numbness: No  tingling: Yes  weakness: Yes  weight loss: No  genital pain: No  hematuria: No  Pain severity: severe  Improvement on treatment: mild  The patient location is: [place]  The chief complaint leading to consultation is: painful cyst    Visit type: audiovisual    Face to Face time with patient: 15 minutes of total time spent on the encounter, which includes face to face time and non-face to face time preparing to see the patient (eg, review of tests), Obtaining and/or reviewing separately obtained history, Documenting clinical information in the electronic or other health record, Independently interpreting results (not separately reported) and communicating results to the patient/family/caregiver, or Care coordination (not separately reported).         Each patient to whom he or she provides medical services by telemedicine is:  (1) informed of the relationship between the physician and patient and the respective role of any other health care provider with respect to management of the patient; and (2) notified that he or she may decline to receive medical services by telemedicine and may withdraw from such care at any time.    Notes:  47 years old female with buttock painful cysts for the last 7 days.  No fever or chills.   Patient was using ibuprofen with no significant improvement.  She was previously diagnosed with neurofibromatosis.  I was not able to properly assess the cysts because of problems with the online connection.    Diagnoses and all orders for this visit:    Neurofibromatosis, type I (von Recklinghausen's disease)  -     doxycycline (MONODOX) 100 MG capsule; Take 1 capsule (100 mg total) by mouth 2 (two) times daily with meals. for 10 days  -     predniSONE (DELTASONE) 10 MG tablet; Take 1 tablet (10 mg total) by mouth 2 (two) times daily. for 5 days    Infected cyst of skin  -     doxycycline (MONODOX) 100 MG capsule; Take 1 capsule (100 mg total) by mouth 2 (two) times daily with meals. for 10 days  -     predniSONE (DELTASONE) 10 MG tablet; Take 1 tablet (10 mg total) by mouth 2 (two) times daily. for 5 days     Continue with ibuprofen as needed.    ER precautions given.    Follow-up with PCP in 1 week.

## 2023-05-09 ENCOUNTER — TELEPHONE (OUTPATIENT)
Dept: FAMILY MEDICINE | Facility: CLINIC | Age: 48
End: 2023-05-09
Payer: MEDICARE

## 2023-05-09 ENCOUNTER — TELEPHONE (OUTPATIENT)
Dept: PAIN MEDICINE | Facility: CLINIC | Age: 48
End: 2023-05-09
Payer: MEDICARE

## 2023-05-09 NOTE — TELEPHONE ENCOUNTER
Spoke with pt advised Dr. Van does not see virtual appointments.  Pt will make an appointment closer to home.

## 2023-05-09 NOTE — TELEPHONE ENCOUNTER
----- Message from Astrid Khan MA sent at 5/9/2023  8:26 AM CDT -----  Regarding: FW: pt back/leg pain    ----- Message -----  From: Alexander Kelly MA  Sent: 5/8/2023   3:34 PM CDT  To: Astrid Khan MA  Subject: pt back/leg pain                                 Good afternoon we were trying to scheduled an appt for pt with PCP, but the schedule is private. Dr. Amaral was wondering if you can schedule her sometime next week. Please and Thank you!        Pt scheduled to see Dr Dixon next week

## 2023-05-18 ENCOUNTER — OFFICE VISIT (OUTPATIENT)
Dept: FAMILY MEDICINE | Facility: CLINIC | Age: 48
End: 2023-05-18
Payer: MEDICARE

## 2023-05-18 VITALS
DIASTOLIC BLOOD PRESSURE: 80 MMHG | BODY MASS INDEX: 25.15 KG/M2 | HEART RATE: 70 BPM | WEIGHT: 136.69 LBS | HEIGHT: 62 IN | OXYGEN SATURATION: 98 % | SYSTOLIC BLOOD PRESSURE: 116 MMHG

## 2023-05-18 DIAGNOSIS — M54.9 DORSALGIA, UNSPECIFIED: ICD-10-CM

## 2023-05-18 DIAGNOSIS — F20.3 UNDIFFERENTIATED SCHIZOPHRENIA: ICD-10-CM

## 2023-05-18 DIAGNOSIS — F31.9 BIPOLAR AFFECTIVE DISORDER, REMISSION STATUS UNSPECIFIED: ICD-10-CM

## 2023-05-18 DIAGNOSIS — M54.32 LEFT SCIATIC NERVE PAIN: Primary | ICD-10-CM

## 2023-05-18 DIAGNOSIS — Q85.01 NEUROFIBROMATOSIS, TYPE I (VON RECKLINGHAUSEN'S DISEASE): ICD-10-CM

## 2023-05-18 PROCEDURE — 99214 PR OFFICE/OUTPT VISIT, EST, LEVL IV, 30-39 MIN: ICD-10-PCS | Mod: S$GLB,,, | Performed by: STUDENT IN AN ORGANIZED HEALTH CARE EDUCATION/TRAINING PROGRAM

## 2023-05-18 PROCEDURE — 99214 OFFICE O/P EST MOD 30 MIN: CPT | Mod: S$GLB,,, | Performed by: STUDENT IN AN ORGANIZED HEALTH CARE EDUCATION/TRAINING PROGRAM

## 2023-05-18 PROCEDURE — 3079F DIAST BP 80-89 MM HG: CPT | Mod: CPTII,S$GLB,, | Performed by: STUDENT IN AN ORGANIZED HEALTH CARE EDUCATION/TRAINING PROGRAM

## 2023-05-18 PROCEDURE — 1159F PR MEDICATION LIST DOCUMENTED IN MEDICAL RECORD: ICD-10-PCS | Mod: CPTII,S$GLB,, | Performed by: STUDENT IN AN ORGANIZED HEALTH CARE EDUCATION/TRAINING PROGRAM

## 2023-05-18 PROCEDURE — 1160F RVW MEDS BY RX/DR IN RCRD: CPT | Mod: CPTII,S$GLB,, | Performed by: STUDENT IN AN ORGANIZED HEALTH CARE EDUCATION/TRAINING PROGRAM

## 2023-05-18 PROCEDURE — 3079F PR MOST RECENT DIASTOLIC BLOOD PRESSURE 80-89 MM HG: ICD-10-PCS | Mod: CPTII,S$GLB,, | Performed by: STUDENT IN AN ORGANIZED HEALTH CARE EDUCATION/TRAINING PROGRAM

## 2023-05-18 PROCEDURE — 3074F PR MOST RECENT SYSTOLIC BLOOD PRESSURE < 130 MM HG: ICD-10-PCS | Mod: CPTII,S$GLB,, | Performed by: STUDENT IN AN ORGANIZED HEALTH CARE EDUCATION/TRAINING PROGRAM

## 2023-05-18 PROCEDURE — 1159F MED LIST DOCD IN RCRD: CPT | Mod: CPTII,S$GLB,, | Performed by: STUDENT IN AN ORGANIZED HEALTH CARE EDUCATION/TRAINING PROGRAM

## 2023-05-18 PROCEDURE — 1160F PR REVIEW ALL MEDS BY PRESCRIBER/CLIN PHARMACIST DOCUMENTED: ICD-10-PCS | Mod: CPTII,S$GLB,, | Performed by: STUDENT IN AN ORGANIZED HEALTH CARE EDUCATION/TRAINING PROGRAM

## 2023-05-18 PROCEDURE — 3008F BODY MASS INDEX DOCD: CPT | Mod: CPTII,S$GLB,, | Performed by: STUDENT IN AN ORGANIZED HEALTH CARE EDUCATION/TRAINING PROGRAM

## 2023-05-18 PROCEDURE — 3008F PR BODY MASS INDEX (BMI) DOCUMENTED: ICD-10-PCS | Mod: CPTII,S$GLB,, | Performed by: STUDENT IN AN ORGANIZED HEALTH CARE EDUCATION/TRAINING PROGRAM

## 2023-05-18 PROCEDURE — 3074F SYST BP LT 130 MM HG: CPT | Mod: CPTII,S$GLB,, | Performed by: STUDENT IN AN ORGANIZED HEALTH CARE EDUCATION/TRAINING PROGRAM

## 2023-05-18 RX ORDER — METHYLPREDNISOLONE 4 MG/1
TABLET ORAL
Qty: 21 EACH | Refills: 0 | Status: SHIPPED | OUTPATIENT
Start: 2023-05-18 | End: 2023-06-08

## 2023-05-18 RX ORDER — METHOCARBAMOL 500 MG/1
500 TABLET, FILM COATED ORAL 3 TIMES DAILY PRN
Qty: 30 TABLET | Refills: 0 | Status: SHIPPED | OUTPATIENT
Start: 2023-05-18 | End: 2023-05-28

## 2023-05-19 ENCOUNTER — TELEPHONE (OUTPATIENT)
Dept: FAMILY MEDICINE | Facility: CLINIC | Age: 48
End: 2023-05-19
Payer: MEDICARE

## 2023-05-19 NOTE — TELEPHONE ENCOUNTER
----- Message from Darlene Larsen sent at 5/19/2023  8:56 AM CDT -----  Good morning Dr. Dixon,  I spoke with the patient to schedule her MRI. She would like to know if she can still take the valium she got about 4 months ago for the other MRI.     Thank you

## 2023-05-20 NOTE — PROGRESS NOTES
Patient ID: Kenzie Saunders is a 47 y.o. female.     Chief Complaint: Back Pain    Back Pain  This is a chronic problem. Episode onset: 6 months ago. The problem occurs constantly. The problem is unchanged. The pain is present in the lumbar spine. The quality of the pain is described as aching. The pain does not radiate. The pain is at a severity of 6/10. The pain is moderate. The pain is The same all the time. The symptoms are aggravated by position. Pertinent negatives include no bladder incontinence, bowel incontinence, chest pain, fever, headaches, leg pain, numbness, paresis, perianal numbness, tingling or weakness. 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 She has tried NSAIDs for the symptoms. The treatment provided mild relief.        Review of Systems  Review of Systems   Constitutional:  Negative for fever.   HENT:  Negative for ear pain and sinus pain.    Eyes:  Negative for discharge.   Respiratory:  Negative for cough and shortness of breath.    Cardiovascular:  Negative for chest pain and leg swelling.   Gastrointestinal:  Negative for bowel incontinence, diarrhea, nausea and vomiting.   Genitourinary:  Negative for bladder incontinence and urgency.   Musculoskeletal:  Positive for back pain. Negative for myalgias.   Skin:  Negative for rash.   Neurological:  Negative for tingling, weakness, numbness and headaches.   Psychiatric/Behavioral:  Negative for depression.    All other systems reviewed and are negative.    Currently Medications  Current Outpatient Medications on File Prior to Visit   Medication Sig Dispense Refill    atorvastatin (LIPITOR) 10 MG tablet Take 1 tablet (10 mg total) by mouth once daily. 90 tablet 3    diazePAM (VALIUM) 2 MG tablet Take 1 tablet (2 mg total) by mouth once. Take an hour prior to procedure for 1 dose 1 tablet 0    divalproex ER (DEPAKOTE) 500 MG Tb24 Take 1,000 mg by mouth nightly.      escitalopram oxalate (LEXAPRO ORAL) Take 25 mg by mouth.    "    traZODone (DESYREL) 100 MG tablet Take 100 mg by mouth nightly.      VRAYLAR 3 mg Cap Take 1 capsule by mouth once daily.       No current facility-administered medications on file prior to visit.       Physical  Exam  Vitals:    05/18/23 1108   BP: 116/80   Pulse: 70   SpO2: 98%   Weight: 62 kg (136 lb 11 oz)   Height: 5' 2" (1.575 m)      Body mass index is 25 kg/m².  Wt Readings from Last 3 Encounters:   05/18/23 62 kg (136 lb 11 oz)   03/16/23 64.5 kg (142 lb 4.9 oz)   02/23/23 65.3 kg (144 lb)       Physical Exam  Vitals and nursing note reviewed.   Constitutional:       General: She is not in acute distress.     Appearance: She is not ill-appearing.      Comments: Neurofibromas present   HENT:      Head: Normocephalic and atraumatic.      Right Ear: External ear normal.      Left Ear: External ear normal.      Nose: Nose normal.      Mouth/Throat:      Mouth: Mucous membranes are moist.   Eyes:      Extraocular Movements: Extraocular movements intact.      Conjunctiva/sclera: Conjunctivae normal.   Cardiovascular:      Rate and Rhythm: Normal rate and regular rhythm.      Pulses: Normal pulses.      Heart sounds: No murmur heard.  Pulmonary:      Effort: Pulmonary effort is normal. No respiratory distress.      Breath sounds: No wheezing.   Abdominal:      General: There is no distension.      Palpations: Abdomen is soft. There is no mass.      Tenderness: There is no abdominal tenderness.   Musculoskeletal:         General: No swelling.      Cervical back: Normal range of motion.   Skin:     Coloration: Skin is not jaundiced.      Findings: No rash.   Neurological:      General: No focal deficit present.      Mental Status: She is alert and oriented to person, place, and time.   Psychiatric:         Mood and Affect: Mood normal.         Thought Content: Thought content normal.       Labs:    Complete Blood Count  Lab Results   Component Value Date    RBC 4.39 02/17/2023    HGB 12.4 02/17/2023    HCT 37.5 " 02/17/2023    MCV 85 02/17/2023    MCH 28.2 02/17/2023    MCHC 33.1 02/17/2023    RDW 11.4 (L) 02/17/2023     02/17/2023    MPV 10.1 02/17/2023    GRAN 2.3 02/17/2023    GRAN 52.4 02/17/2023    LYMPH 1.4 02/17/2023    LYMPH 32.6 02/17/2023    MONO 0.5 02/17/2023    MONO 10.6 02/17/2023    EOS 0.2 02/17/2023    BASO 0.03 02/17/2023    EOSINOPHIL 3.7 02/17/2023    BASOPHIL 0.7 02/17/2023    DIFFMETHOD Automated 02/17/2023       Comprehensive Metabolic Panel  Lab Results   Component Value Date    GLU 91 02/17/2023    BUN 6 (L) 02/17/2023    CREATININE 0.54 02/17/2023     02/17/2023    K 3.9 02/17/2023     02/17/2023    PROT 6.9 02/17/2023    ALBUMIN 3.9 02/17/2023    BILITOT 0.5 02/17/2023    AST 20 02/17/2023    ALKPHOS 79 02/17/2023    CO2 27 02/17/2023    ALT 14 02/17/2023    ANIONGAP 4 (L) 02/17/2023       TSH  No results found for: TSH    Imaging:  X-Ray Chest PA And Lateral  Narrative: EXAMINATION:  XR CHEST PA AND LATERAL    CLINICAL HISTORY:  Chest pain, unspecified    COMPARISON:  None    FINDINGS:  Cardiac silhouette is normal.  The lungs demonstrate no evidence of active disease.  No evidence of pleural effusion or pneumothorax.  Bones appear intact.   Several soft tissue calcifications are seen along the left chest wall and possibly left breast.  Impression: No acute process seen.    Electronically signed by: Carson Nielsen MD  Date:    02/17/2023  Time:    12:55      Assessment/Plan:    1. Left sciatic nerve pain  -     methylPREDNISolone (MEDROL DOSEPACK) 4 mg tablet; use as directed  Dispense: 21 each; Refill: 0  -     methocarbamoL (ROBAXIN) 500 MG Tab; Take 1 tablet (500 mg total) by mouth 3 (three) times daily as needed (back pain).  Dispense: 30 tablet; Refill: 0    2. Dorsalgia, unspecified  -     MRI Lumbar Spine Without Contrast; Future; Expected date: 05/18/2023    3. Undifferentiated schizophrenia  Assessment & Plan:  - stable  - following with psych, dr khoury  - denies A/V  hallucaintiosns      4. Neurofibromatosis, type I (von Recklinghausen's disease)    5. Bipolar affective disorder, remission status unspecified         Discussed how to stay healthy including: diet, exercise, refraining from smoking and discussed screening exams / tests needed for age, sex and family Hx.        Duong Dixon MD

## 2023-05-26 ENCOUNTER — HOSPITAL ENCOUNTER (OUTPATIENT)
Dept: RADIOLOGY | Facility: HOSPITAL | Age: 48
Discharge: HOME | End: 2023-05-26
Attending: STUDENT IN AN ORGANIZED HEALTH CARE EDUCATION/TRAINING PROGRAM
Payer: MEDICARE

## 2023-05-26 DIAGNOSIS — M54.9 DORSALGIA, UNSPECIFIED: ICD-10-CM

## 2023-05-26 PROCEDURE — 72148 MRI LUMBAR SPINE W/O DYE: CPT | Mod: 26,,, | Performed by: RADIOLOGY

## 2023-05-26 PROCEDURE — 72148 MRI LUMBAR SPINE WITHOUT CONTRAST: ICD-10-PCS | Mod: 26,,, | Performed by: RADIOLOGY

## 2023-05-26 PROCEDURE — 72148 MRI LUMBAR SPINE W/O DYE: CPT | Mod: TC,PO

## 2023-07-19 ENCOUNTER — PATIENT OUTREACH (OUTPATIENT)
Dept: ADMINISTRATIVE | Facility: HOSPITAL | Age: 48
End: 2023-07-19
Payer: MEDICARE

## 2023-08-04 ENCOUNTER — PATIENT MESSAGE (OUTPATIENT)
Dept: FAMILY MEDICINE | Facility: CLINIC | Age: 48
End: 2023-08-04
Payer: MEDICARE

## 2023-08-23 ENCOUNTER — TELEPHONE (OUTPATIENT)
Dept: FAMILY MEDICINE | Facility: CLINIC | Age: 48
End: 2023-08-23
Payer: MEDICARE

## 2023-08-23 NOTE — TELEPHONE ENCOUNTER
Pt states pain has been present for 1 week, became more severe over last 2 days.     Pt reports electrical shocks of pain in left leg and severe back pain.    Requesting pain medication until her she is able to see neurology on 08/29  ------------------------------------------------------------------------------      ----- Message from Malissa Barroso sent at 8/23/2023  9:57 AM CDT -----  Regarding: advice  Contact: [phone]  Type:  Needs Medical Advice    Who Called:  pt   Symptoms (please be specific):  severe back pain.   patient needs something for pain until she sees the Neurologist on Tuesday 08/29. Thanks     How long has patient had these symptoms:     Pharmacy name and phone #:   [id number] - [address] AT [facility]  Would the patient rather a call back or a response via [facility]?  Call   Best Call Back Number: [phone]   Additional Information:

## 2023-08-24 RX ORDER — TRAMADOL HYDROCHLORIDE 50 MG/1
50 TABLET ORAL EVERY 12 HOURS PRN
Qty: 10 TABLET | Refills: 0 | Status: SHIPPED | OUTPATIENT
Start: 2023-08-24 | End: 2023-08-29

## 2023-09-08 ENCOUNTER — OFFICE VISIT (OUTPATIENT)
Dept: OBSTETRICS AND GYNECOLOGY | Facility: CLINIC | Age: 48
End: 2023-09-08
Payer: MEDICARE

## 2023-09-08 VITALS
SYSTOLIC BLOOD PRESSURE: 129 MMHG | BODY MASS INDEX: 26.53 KG/M2 | DIASTOLIC BLOOD PRESSURE: 90 MMHG | WEIGHT: 145.06 LBS

## 2023-09-08 DIAGNOSIS — Z12.31 ENCOUNTER FOR SCREENING MAMMOGRAM FOR MALIGNANT NEOPLASM OF BREAST: ICD-10-CM

## 2023-09-08 DIAGNOSIS — N95.1 MENOPAUSAL SYMPTOMS: ICD-10-CM

## 2023-09-08 DIAGNOSIS — R23.2 HOT FLASHES: ICD-10-CM

## 2023-09-08 DIAGNOSIS — Z01.419 WELL WOMAN EXAM WITH ROUTINE GYNECOLOGICAL EXAM: Primary | ICD-10-CM

## 2023-09-08 DIAGNOSIS — Z79.890 HORMONE REPLACEMENT THERAPY (HRT): ICD-10-CM

## 2023-09-08 LAB
C TRACH DNA SPEC QL NAA+PROBE: NOT DETECTED
N GONORRHOEA DNA SPEC QL NAA+PROBE: NOT DETECTED

## 2023-09-08 PROCEDURE — 99999 PR PBB SHADOW E&M-EST. PATIENT-LVL III: ICD-10-PCS | Mod: PBBFAC,,, | Performed by: OBSTETRICS & GYNECOLOGY

## 2023-09-08 PROCEDURE — 3074F PR MOST RECENT SYSTOLIC BLOOD PRESSURE < 130 MM HG: ICD-10-PCS | Mod: CPTII,S$GLB,, | Performed by: OBSTETRICS & GYNECOLOGY

## 2023-09-08 PROCEDURE — 3080F PR MOST RECENT DIASTOLIC BLOOD PRESSURE >= 90 MM HG: ICD-10-PCS | Mod: CPTII,S$GLB,, | Performed by: OBSTETRICS & GYNECOLOGY

## 2023-09-08 PROCEDURE — 3008F BODY MASS INDEX DOCD: CPT | Mod: CPTII,S$GLB,, | Performed by: OBSTETRICS & GYNECOLOGY

## 2023-09-08 PROCEDURE — 1160F PR REVIEW ALL MEDS BY PRESCRIBER/CLIN PHARMACIST DOCUMENTED: ICD-10-PCS | Mod: CPTII,S$GLB,, | Performed by: OBSTETRICS & GYNECOLOGY

## 2023-09-08 PROCEDURE — 99999 PR PBB SHADOW E&M-EST. PATIENT-LVL III: CPT | Mod: PBBFAC,,, | Performed by: OBSTETRICS & GYNECOLOGY

## 2023-09-08 PROCEDURE — 1159F PR MEDICATION LIST DOCUMENTED IN MEDICAL RECORD: ICD-10-PCS | Mod: CPTII,S$GLB,, | Performed by: OBSTETRICS & GYNECOLOGY

## 2023-09-08 PROCEDURE — G0101 CA SCREEN;PELVIC/BREAST EXAM: HCPCS | Mod: GZ,S$GLB,, | Performed by: OBSTETRICS & GYNECOLOGY

## 2023-09-08 PROCEDURE — G0101 PR CA SCREEN;PELVIC/BREAST EXAM: ICD-10-PCS | Mod: GZ,S$GLB,, | Performed by: OBSTETRICS & GYNECOLOGY

## 2023-09-08 PROCEDURE — 87591 N.GONORRHOEAE DNA AMP PROB: CPT

## 2023-09-08 PROCEDURE — 3074F SYST BP LT 130 MM HG: CPT | Mod: CPTII,S$GLB,, | Performed by: OBSTETRICS & GYNECOLOGY

## 2023-09-08 PROCEDURE — 81514 NFCT DS BV&VAGINITIS DNA ALG: CPT

## 2023-09-08 PROCEDURE — 3008F PR BODY MASS INDEX (BMI) DOCUMENTED: ICD-10-PCS | Mod: CPTII,S$GLB,, | Performed by: OBSTETRICS & GYNECOLOGY

## 2023-09-08 PROCEDURE — 3080F DIAST BP >= 90 MM HG: CPT | Mod: CPTII,S$GLB,, | Performed by: OBSTETRICS & GYNECOLOGY

## 2023-09-08 PROCEDURE — 1160F RVW MEDS BY RX/DR IN RCRD: CPT | Mod: CPTII,S$GLB,, | Performed by: OBSTETRICS & GYNECOLOGY

## 2023-09-08 PROCEDURE — 1159F MED LIST DOCD IN RCRD: CPT | Mod: CPTII,S$GLB,, | Performed by: OBSTETRICS & GYNECOLOGY

## 2023-09-08 PROCEDURE — 88142 CYTOPATH C/V THIN LAYER: CPT

## 2023-09-08 RX ORDER — ESTRADIOL 0.5 MG/1
0.5 TABLET ORAL DAILY
Qty: 30 TABLET | Refills: 11 | Status: SHIPPED | OUTPATIENT
Start: 2023-09-08 | End: 2024-09-07

## 2023-09-08 RX ORDER — MIRTAZAPINE 15 MG/1
TABLET, FILM COATED ORAL
COMMUNITY
Start: 2023-07-12

## 2023-09-08 RX ORDER — DOXYCYCLINE 100 MG/1
CAPSULE ORAL
COMMUNITY
Start: 2023-05-18

## 2023-09-08 NOTE — PROGRESS NOTES
CC: Annual check-up    SUBJECTIVE:   48 y.o. female No obstetric history on file.  for annual routine Pap and checkup. No LMP recorded. Patient has had a hysterectomy..  Still has her ovaries. She c/o nightly hot flashes x 1 month now and desires medication to help reduce. Also endorses some breast tenderness. Denies any vaginal bleeding or discharge. Has not had a pap smear since her hysterectomy at age 20 years old (states was told because it was enlarged).     Past Medical History:   Diagnosis Date    Astigmatism     Bipolar 2 disorder     ECHO showing normal mitral valve 3/27/2017    GERD (gastroesophageal reflux disease)     Migraine headache     Neurofibromatosis, type 1 (von Recklinghausen's disease)     patient report MVP but ECHO showing normal mitral valve 3/27/2017    Sciatica      Past Surgical History:   Procedure Laterality Date    HYSTERECTOMY       Social History     Socioeconomic History    Marital status: Single   Tobacco Use    Smoking status: Never    Smokeless tobacco: Never   Substance and Sexual Activity    Alcohol use: No    Drug use: No    Sexual activity: Never     Family History   Problem Relation Age of Onset    Neurofibromatosis Mother     Neurofibromatosis Maternal Aunt     Breast cancer Maternal Aunt     Neurofibromatosis Maternal Grandfather     Neurofibromatosis Cousin      OB History    Para Term  AB Living       0         SAB IAB Ectopic Multiple Live Births                     Current Outpatient Medications   Medication Sig Dispense Refill    atorvastatin (LIPITOR) 10 MG tablet Take 1 tablet (10 mg total) by mouth once daily. (Patient not taking: Reported on 2023) 90 tablet 3    diazePAM (VALIUM) 2 MG tablet Take 1 tablet (2 mg total) by mouth once. Take an hour prior to procedure for 1 dose 1 tablet 0    doxycycline (MONODOX) 100 MG capsule TAKE 1 CAPSULE (100 MG TOTAL) BY MOUTH 2 (TWO) TIMES DAILY WITH MEALS. FOR 10 DAYS      escitalopram oxalate (LEXAPRO ORAL)  Take 25 mg by mouth.       mirtazapine (REMERON) 15 MG tablet TAKE 1 TABLET BY MOUTH EVERY DAY AT BEDTIME FOR 30 DAYS       No current facility-administered medications for this visit.     Allergies: Haloperidol lactate, Risperidone analogues, and Lansoprazole     ROS:  Constitutional: no weight loss, weight gain, fever, fatigue  Eyes:  No vision changes, glasses/contacts  ENT/Mouth: No ulcers, sinus problems, ears ringing, headache  Cardiovascular: No inability to lie flat, chest pain, exercise intolerance, swelling, heart palpitations  Respiratory: No wheezing, coughing blood, shortness of breath, or cough  Gastrointestinal: No diarrhea, bloody stool, nausea/vomiting, constipation, gas, hemorrhoids  Genitourinary: No blood in urine, painful urination, urgency of urination, frequency of urination, incomplete emptying, incontinence, abnormal bleeding, painful periods, heavy periods, vaginal discharge, vaginal odor, painful intercourse, sexual problems, bleeding after intercourse.  Musculoskeletal: No muscle weakness  Skin/Breast: No painful breasts, nipple discharge, masses, rash, ulcers  Neurological: No passing out, seizures, numbness, headache  Endocrine: No diabetes, hypothyroid, hyperthyroid, hot flashes, hair loss, abnormal hair growth, ance  Psychiatric: No depression, crying  Hematologic: No bruises, bleeding, swollen lymph nodes, anemia.      OBJECTIVE:   The patient appears well, alert, oriented x 3, in no distress.  BP (!) 129/90   Wt 65.8 kg (145 lb 1 oz)   BMI 26.53 kg/m²   NECK: no thyromegaly, trachea midline  SKIN: lesions: consistent w/ NF  BREAST EXAM: breasts appear normal, no suspicious masses, no skin or nipple changes or axillary nodes  ABDOMEN: no hernias, masses, or hepatosplenomegaly  GENITALIA: normal external genitalia, no erythema, no discharge  URETHRA: normal urethra, normal urethral meatus  VAGINA: vaginal discharge white  CERVIX: no lesions or cervical motion tenderness  UTERUS:  uterus absent  ADNEXA: no mass, fullness, tenderness      ASSESSMENT:   well woman  1. Well woman exam with routine gynecological exam    2. Hot flashes        PLAN:   Pap collected today  Mammogram due in Nov 2023  return annually or prn    Vivi Montana MD  \[facility]\"" Family Medicine, PGY-2  09/08/2023    Orders Placed This Encounter    VAGINOSIS SCREEN BY DNA PROBE    C. trachomatis/N. gonorrhoeae by AMP DNA [facility]; Vagina    Liquid-Based Pap Smear, Screening

## 2023-09-09 ENCOUNTER — TELEPHONE (OUTPATIENT)
Dept: OBSTETRICS AND GYNECOLOGY | Facility: HOSPITAL | Age: 48
End: 2023-09-09
Payer: MEDICARE

## 2023-09-09 LAB
BACTERIAL VAGINOSIS DNA: POSITIVE
CANDIDA GLABRATA DNA: NEGATIVE
CANDIDA KRUSEI DNA: NEGATIVE
CANDIDA RRNA VAG QL PROBE: NEGATIVE
T VAGINALIS RRNA GENITAL QL PROBE: POSITIVE

## 2023-09-09 RX ORDER — METRONIDAZOLE 500 MG/1
2000 TABLET ORAL ONCE
Qty: 4 TABLET | Refills: 0 | Status: SHIPPED | OUTPATIENT
Start: 2023-09-09 | End: 2023-09-09

## 2023-09-09 NOTE — TELEPHONE ENCOUNTER
Notify pt that she has Trichomonas and BV infxn and needs to take Flagyl as prescribed and f/u in 3 weeks to recheck if STD is resolved. Partner needs to get Tx'd for Trich as well . And abstain from intercourse until infxn tx'd

## 2023-09-11 ENCOUNTER — PATIENT MESSAGE (OUTPATIENT)
Dept: ADMINISTRATIVE | Facility: HOSPITAL | Age: 48
End: 2023-09-11
Payer: MEDICARE

## 2023-09-11 NOTE — TELEPHONE ENCOUNTER
Pt informed of positive results and will  medication. Pt states she is not concerned about reinfection because its been years. She has been scheduled to come back 10/5/23 for SHAILESH

## 2023-09-15 ENCOUNTER — PATIENT MESSAGE (OUTPATIENT)
Dept: ADMINISTRATIVE | Facility: HOSPITAL | Age: 48
End: 2023-09-15
Payer: MEDICARE

## 2023-09-15 ENCOUNTER — PATIENT OUTREACH (OUTPATIENT)
Dept: ADMINISTRATIVE | Facility: HOSPITAL | Age: 48
End: 2023-09-15
Payer: MEDICARE

## 2023-09-15 DIAGNOSIS — Z12.11 SPECIAL SCREENING FOR MALIGNANT NEOPLASMS, COLON: Primary | ICD-10-CM

## 2023-09-15 LAB
FINAL PATHOLOGIC DIAGNOSIS: NORMAL
Lab: NORMAL

## 2023-09-15 NOTE — PROGRESS NOTES
Campaign follow up. Portal message sent re: colorectal cancer screening, mammogram tasked to pt via portal.

## 2023-10-04 ENCOUNTER — OFFICE VISIT (OUTPATIENT)
Dept: OBSTETRICS AND GYNECOLOGY | Facility: CLINIC | Age: 48
End: 2023-10-04
Payer: MEDICARE

## 2023-10-04 VITALS
HEIGHT: 62 IN | SYSTOLIC BLOOD PRESSURE: 147 MMHG | WEIGHT: 142.88 LBS | DIASTOLIC BLOOD PRESSURE: 94 MMHG | BODY MASS INDEX: 26.29 KG/M2

## 2023-10-04 DIAGNOSIS — A59.9 TRICHOMONAS INFECTION: Primary | ICD-10-CM

## 2023-10-04 PROCEDURE — 1160F RVW MEDS BY RX/DR IN RCRD: CPT | Mod: CPTII,S$GLB,, | Performed by: OBSTETRICS & GYNECOLOGY

## 2023-10-04 PROCEDURE — 3077F SYST BP >= 140 MM HG: CPT | Mod: CPTII,S$GLB,, | Performed by: OBSTETRICS & GYNECOLOGY

## 2023-10-04 PROCEDURE — 81514 NFCT DS BV&VAGINITIS DNA ALG: CPT | Performed by: OBSTETRICS & GYNECOLOGY

## 2023-10-04 PROCEDURE — 3008F PR BODY MASS INDEX (BMI) DOCUMENTED: ICD-10-PCS | Mod: CPTII,S$GLB,, | Performed by: OBSTETRICS & GYNECOLOGY

## 2023-10-04 PROCEDURE — 3080F DIAST BP >= 90 MM HG: CPT | Mod: CPTII,S$GLB,, | Performed by: OBSTETRICS & GYNECOLOGY

## 2023-10-04 PROCEDURE — 3008F BODY MASS INDEX DOCD: CPT | Mod: CPTII,S$GLB,, | Performed by: OBSTETRICS & GYNECOLOGY

## 2023-10-04 PROCEDURE — 1160F PR REVIEW ALL MEDS BY PRESCRIBER/CLIN PHARMACIST DOCUMENTED: ICD-10-PCS | Mod: CPTII,S$GLB,, | Performed by: OBSTETRICS & GYNECOLOGY

## 2023-10-04 PROCEDURE — 99999 PR PBB SHADOW E&M-EST. PATIENT-LVL III: ICD-10-PCS | Mod: PBBFAC,,, | Performed by: OBSTETRICS & GYNECOLOGY

## 2023-10-04 PROCEDURE — 99213 OFFICE O/P EST LOW 20 MIN: CPT | Mod: S$GLB,,, | Performed by: OBSTETRICS & GYNECOLOGY

## 2023-10-04 PROCEDURE — 3080F PR MOST RECENT DIASTOLIC BLOOD PRESSURE >= 90 MM HG: ICD-10-PCS | Mod: CPTII,S$GLB,, | Performed by: OBSTETRICS & GYNECOLOGY

## 2023-10-04 PROCEDURE — 3077F PR MOST RECENT SYSTOLIC BLOOD PRESSURE >= 140 MM HG: ICD-10-PCS | Mod: CPTII,S$GLB,, | Performed by: OBSTETRICS & GYNECOLOGY

## 2023-10-04 PROCEDURE — 99999 PR PBB SHADOW E&M-EST. PATIENT-LVL III: CPT | Mod: PBBFAC,,, | Performed by: OBSTETRICS & GYNECOLOGY

## 2023-10-04 PROCEDURE — 1159F PR MEDICATION LIST DOCUMENTED IN MEDICAL RECORD: ICD-10-PCS | Mod: CPTII,S$GLB,, | Performed by: OBSTETRICS & GYNECOLOGY

## 2023-10-04 PROCEDURE — 1159F MED LIST DOCD IN RCRD: CPT | Mod: CPTII,S$GLB,, | Performed by: OBSTETRICS & GYNECOLOGY

## 2023-10-04 PROCEDURE — 99213 PR OFFICE/OUTPT VISIT, EST, LEVL III, 20-29 MIN: ICD-10-PCS | Mod: S$GLB,,, | Performed by: OBSTETRICS & GYNECOLOGY

## 2023-10-04 RX ORDER — METRONIDAZOLE 500 MG/1
TABLET ORAL
COMMUNITY
Start: 2023-09-09

## 2023-10-04 RX ORDER — CARIPRAZINE 3 MG/1
3 CAPSULE, GELATIN COATED ORAL
COMMUNITY
Start: 2023-09-10

## 2023-10-04 NOTE — PROGRESS NOTES
CC: Vaginal Discharge, h/o Trich    HPI:   48 y.o. female No obstetric history on file. No complaints, hasn't had sex in yrs No LMP recorded. Patient has had a hysterectomy..  She is not sexually active.  She uses no method for contraception.    ROS:  GENERAL: No fever, chills, fatigability or weight loss.  VULVAR: No pain, no lesions and no itching.  VAGINAL: No relaxation, no itching, no discharge, no abnormal bleeding and no lesions.  ABDOMEN: No abdominal pain. Denies nausea. Denies vomiting. No diarrhea. No constipation  BREAST: Denies pain. No lumps. No discharge.  URINARY: No incontinence, no nocturia, no frequency and no dysuria.  CARDIOVASCULAR: No chest pain. No shortness of breath. No leg cramps.  NEUROLOGICAL: No headaches. No vision changes.        Vitals:    10/04/23 0917   BP: (!) 147/94         OBJECTIVE:   She appears well, afebrile.  Abdomen: benign, soft, nontender, no masses.  VULVA: Normal external female genitalia, normal urethra, normal urethral meatus  VAGINA:discharge: copious and white  CERVIXNormal  UTERUSnormal  ADNEXAnormal adnexa and no mass, fullness, tenderness        ASSESSMENT:   trichomonas    PLAN:   Orders Placed This Encounter    Vaginosis Screen by DNA Probe     ROV prn if symptoms persist or worsen.  If possible notify last sexual contact   Discussed possibly bv still being present

## 2023-10-09 ENCOUNTER — TELEPHONE (OUTPATIENT)
Dept: OBSTETRICS AND GYNECOLOGY | Facility: CLINIC | Age: 48
End: 2023-10-09
Payer: MEDICARE

## 2023-10-09 RX ORDER — METRONIDAZOLE 500 MG/1
2000 TABLET ORAL ONCE
Qty: 8 TABLET | Refills: 0 | Status: SHIPPED | OUTPATIENT
Start: 2023-10-09 | End: 2023-10-09

## 2023-10-09 NOTE — TELEPHONE ENCOUNTER
Notify pt that  Trichomonas infxn is still present and needs to take Flagyl as prescribed and f/u in 3 weeks to recheck if STD is resolved. Partner needs to get Tx'd for Trich as well . And abstain from intercourse until infxn tx'd

## 2023-11-01 ENCOUNTER — PATIENT MESSAGE (OUTPATIENT)
Dept: ADMINISTRATIVE | Facility: HOSPITAL | Age: 48
End: 2023-11-01
Payer: MEDICARE

## 2024-02-07 DIAGNOSIS — Z12.11 COLON CANCER SCREENING: ICD-10-CM

## 2024-03-11 ENCOUNTER — LAB VISIT (OUTPATIENT)
Dept: LAB | Facility: HOSPITAL | Age: 49
End: 2024-03-11
Attending: PSYCHIATRY & NEUROLOGY
Payer: MEDICARE

## 2024-03-11 DIAGNOSIS — Z79.899 OTHER LONG TERM (CURRENT) DRUG THERAPY: ICD-10-CM

## 2024-03-11 DIAGNOSIS — F31.12 BIPOLAR I DISORDER, MOST RECENT EPISODE (OR CURRENT) MANIC, MODERATE: Primary | ICD-10-CM

## 2024-03-11 LAB
ALBUMIN SERPL BCP-MCNC: 4.2 G/DL (ref 3.5–5.2)
ALP SERPL-CCNC: 69 U/L (ref 38–126)
ALT SERPL W/O P-5'-P-CCNC: 10 U/L (ref 10–44)
ANION GAP SERPL CALC-SCNC: 8 MMOL/L (ref 8–16)
AST SERPL-CCNC: 22 U/L (ref 15–46)
BASOPHILS # BLD AUTO: 0.04 K/UL (ref 0–0.2)
BASOPHILS NFR BLD: 0.9 % (ref 0–1.9)
BILIRUB SERPL-MCNC: 0.6 MG/DL (ref 0.1–1)
CALCIUM SERPL-MCNC: 9.3 MG/DL (ref 8.7–10.5)
CHLORIDE SERPL-SCNC: 108 MMOL/L (ref 95–110)
CHOLEST SERPL-MCNC: 262 MG/DL (ref 120–199)
CHOLEST/HDLC SERPL: 4.9 {RATIO} (ref 2–5)
CO2 SERPL-SCNC: 25 MMOL/L (ref 23–29)
CREAT SERPL-MCNC: 0.62 MG/DL (ref 0.5–1.4)
DIFFERENTIAL METHOD BLD: ABNORMAL
EOSINOPHIL # BLD AUTO: 0.1 K/UL (ref 0–0.5)
EOSINOPHIL NFR BLD: 2 % (ref 0–8)
ERYTHROCYTE [DISTWIDTH] IN BLOOD BY AUTOMATED COUNT: 11.3 % (ref 11.5–14.5)
EST. GFR  (NO RACE VARIABLE): >60 ML/MIN/1.73 M^2
ESTIMATED AVG GLUCOSE: 91 MG/DL (ref 68–131)
GLUCOSE SERPL-MCNC: 81 MG/DL (ref 70–110)
HBA1C MFR BLD: 4.8 % (ref 4–5.6)
HCT VFR BLD AUTO: 40.4 % (ref 37–48.5)
HDLC SERPL-MCNC: 53 MG/DL (ref 40–75)
HDLC SERPL: 20.2 % (ref 20–50)
HGB BLD-MCNC: 13.1 G/DL (ref 12–16)
IMM GRANULOCYTES # BLD AUTO: 0.01 K/UL (ref 0–0.04)
IMM GRANULOCYTES NFR BLD AUTO: 0.2 % (ref 0–0.5)
LDLC SERPL CALC-MCNC: 184 MG/DL (ref 63–159)
LYMPHOCYTES # BLD AUTO: 1.9 K/UL (ref 1–4.8)
LYMPHOCYTES NFR BLD: 44.1 % (ref 18–48)
MCH RBC QN AUTO: 27.8 PG (ref 27–31)
MCHC RBC AUTO-ENTMCNC: 32.4 G/DL (ref 32–36)
MCV RBC AUTO: 86 FL (ref 82–98)
MONOCYTES # BLD AUTO: 0.3 K/UL (ref 0.3–1)
MONOCYTES NFR BLD: 7.7 % (ref 4–15)
NEUTROPHILS # BLD AUTO: 2 K/UL (ref 1.8–7.7)
NEUTROPHILS NFR BLD: 45.1 % (ref 38–73)
NONHDLC SERPL-MCNC: 209 MG/DL
NRBC BLD-RTO: 0 /100 WBC
PLATELET # BLD AUTO: 271 K/UL (ref 150–450)
PMV BLD AUTO: 10.7 FL (ref 9.2–12.9)
POTASSIUM SERPL-SCNC: 4.4 MMOL/L (ref 3.5–5.1)
PROT SERPL-MCNC: 8 G/DL (ref 6–8.4)
RBC # BLD AUTO: 4.71 M/UL (ref 4–5.4)
SODIUM SERPL-SCNC: 141 MMOL/L (ref 136–145)
T4 FREE SERPL-MCNC: 0.92 NG/DL (ref 0.71–1.51)
TRIGL SERPL-MCNC: 125 MG/DL (ref 30–150)
TSH SERPL DL<=0.005 MIU/L-ACNC: 2.42 UIU/ML (ref 0.4–4)
UUN UR-MCNC: 15 MG/DL (ref 7–17)
WBC # BLD AUTO: 4.4 K/UL (ref 3.9–12.7)

## 2024-03-11 PROCEDURE — 84443 ASSAY THYROID STIM HORMONE: CPT | Mod: PN | Performed by: PSYCHIATRY & NEUROLOGY

## 2024-03-11 PROCEDURE — 80053 COMPREHEN METABOLIC PANEL: CPT | Mod: PN | Performed by: PSYCHIATRY & NEUROLOGY

## 2024-03-11 PROCEDURE — 36415 COLL VENOUS BLD VENIPUNCTURE: CPT | Mod: PN | Performed by: PSYCHIATRY & NEUROLOGY

## 2024-03-11 PROCEDURE — 80061 LIPID PANEL: CPT | Performed by: PSYCHIATRY & NEUROLOGY

## 2024-03-11 PROCEDURE — 84439 ASSAY OF FREE THYROXINE: CPT | Performed by: PSYCHIATRY & NEUROLOGY

## 2024-03-11 PROCEDURE — 83036 HEMOGLOBIN GLYCOSYLATED A1C: CPT | Performed by: PSYCHIATRY & NEUROLOGY

## 2024-03-11 PROCEDURE — 85025 COMPLETE CBC W/AUTO DIFF WBC: CPT | Mod: PN | Performed by: PSYCHIATRY & NEUROLOGY

## 2024-07-02 ENCOUNTER — PATIENT MESSAGE (OUTPATIENT)
Dept: FAMILY MEDICINE | Facility: CLINIC | Age: 49
End: 2024-07-02
Payer: MEDICARE

## 2024-08-07 ENCOUNTER — TELEPHONE (OUTPATIENT)
Dept: FAMILY MEDICINE | Facility: CLINIC | Age: 49
End: 2024-08-07
Payer: MEDICARE

## 2024-08-08 ENCOUNTER — OFFICE VISIT (OUTPATIENT)
Dept: FAMILY MEDICINE | Facility: CLINIC | Age: 49
End: 2024-08-08
Payer: MEDICARE

## 2024-08-08 VITALS
WEIGHT: 139.13 LBS | HEART RATE: 80 BPM | DIASTOLIC BLOOD PRESSURE: 80 MMHG | HEIGHT: 62 IN | BODY MASS INDEX: 25.6 KG/M2 | OXYGEN SATURATION: 99 % | SYSTOLIC BLOOD PRESSURE: 122 MMHG

## 2024-08-08 DIAGNOSIS — Q85.01 NEUROFIBROMATOSIS, TYPE I (VON RECKLINGHAUSEN'S DISEASE): ICD-10-CM

## 2024-08-08 DIAGNOSIS — H81.13 BENIGN PAROXYSMAL POSITIONAL VERTIGO DUE TO BILATERAL VESTIBULAR DISORDER: Primary | ICD-10-CM

## 2024-08-08 DIAGNOSIS — Z12.31 SCREENING MAMMOGRAM FOR HIGH-RISK PATIENT: ICD-10-CM

## 2024-08-08 DIAGNOSIS — F20.3 UNDIFFERENTIATED SCHIZOPHRENIA: ICD-10-CM

## 2024-08-08 DIAGNOSIS — R42 DIZZINESS: ICD-10-CM

## 2024-08-08 DIAGNOSIS — Z12.11 COLON CANCER SCREENING: ICD-10-CM

## 2024-08-08 DIAGNOSIS — E78.2 MODERATE MIXED HYPERLIPIDEMIA NOT REQUIRING STATIN THERAPY: ICD-10-CM

## 2024-08-08 RX ORDER — MECLIZINE HYDROCHLORIDE 25 MG/1
25 TABLET ORAL 3 TIMES DAILY PRN
Qty: 45 TABLET | Refills: 1 | Status: SHIPPED | OUTPATIENT
Start: 2024-08-08

## 2024-09-17 ENCOUNTER — LAB VISIT (OUTPATIENT)
Dept: LAB | Facility: HOSPITAL | Age: 49
End: 2024-09-17
Attending: STUDENT IN AN ORGANIZED HEALTH CARE EDUCATION/TRAINING PROGRAM
Payer: MEDICARE

## 2024-09-17 DIAGNOSIS — E78.2 MODERATE MIXED HYPERLIPIDEMIA NOT REQUIRING STATIN THERAPY: ICD-10-CM

## 2024-09-17 LAB
CHOLEST SERPL-MCNC: 223 MG/DL (ref 120–199)
CHOLEST/HDLC SERPL: 4.1 {RATIO} (ref 2–5)
HDLC SERPL-MCNC: 55 MG/DL (ref 40–75)
HDLC SERPL: 24.7 % (ref 20–50)
LDLC SERPL CALC-MCNC: 148 MG/DL (ref 63–159)
NONHDLC SERPL-MCNC: 168 MG/DL
TRIGL SERPL-MCNC: 100 MG/DL (ref 30–150)

## 2024-09-17 PROCEDURE — 80061 LIPID PANEL: CPT | Performed by: STUDENT IN AN ORGANIZED HEALTH CARE EDUCATION/TRAINING PROGRAM

## 2024-09-17 PROCEDURE — 36415 COLL VENOUS BLD VENIPUNCTURE: CPT | Mod: PN | Performed by: STUDENT IN AN ORGANIZED HEALTH CARE EDUCATION/TRAINING PROGRAM

## 2024-09-18 ENCOUNTER — TELEPHONE (OUTPATIENT)
Dept: FAMILY MEDICINE | Facility: CLINIC | Age: 49
End: 2024-09-18
Payer: MEDICARE

## 2024-09-18 NOTE — LETTER
September 18, 2024    Kenzie Saunders  [address] [place] [id number]             [facility]  [address] [address]  Phone: [phone]  Fax: [phone] Dear Kenzie Saunders      Please find enclosed copies of your results. If you have any questions or concerns, please don't hesitate to call.    Sincerely,        Duong Dixon MD

## 2024-09-18 NOTE — TELEPHONE ENCOUNTER
----- Message from Duong Dixon MD sent at 9/17/2024  9:22 PM CDT -----  Cholesterol improved. Continue cholesterol medication

## 2024-09-18 NOTE — TELEPHONE ENCOUNTER
----- Message from Kenya Martinez sent at 9/18/2024 11:59 AM CDT -----  Type:  Test Results    Who Called: JEREMIAH HERNANDEZ  Name of Test (Lab/Mammo/Etc): labs  Date of Test: 9/18  Would the patient rather a call back or a response via [facility]? Call back  Best Call Back Number: [phone]  Additional Information:  pt would like a paper copy of results sent to her address

## 2024-10-15 ENCOUNTER — CLINICAL SUPPORT (OUTPATIENT)
Dept: ENDOSCOPY | Facility: HOSPITAL | Age: 49
End: 2024-10-15
Attending: STUDENT IN AN ORGANIZED HEALTH CARE EDUCATION/TRAINING PROGRAM
Payer: MEDICARE

## 2024-10-15 ENCOUNTER — TELEPHONE (OUTPATIENT)
Dept: ENDOSCOPY | Facility: HOSPITAL | Age: 49
End: 2024-10-15

## 2024-10-15 DIAGNOSIS — Z12.11 COLON CANCER SCREENING: ICD-10-CM

## 2024-10-15 NOTE — TELEPHONE ENCOUNTER
Called pt for PAT to schedule colonoscopy. No answer, unable to leave vm for pt to call the Endoscopy Scheduling department back as numbers are either disconnected or incorrect. Sent portal message to pt, but it seems she does not check this regularly.

## 2024-10-15 NOTE — PLAN OF CARE
No answer, unable to leave vm for pt to call the Endoscopy Scheduling department back as numbers are either disconnected or incorrect. Sent portal message to pt, but it seems she does not check this regularly.

## 2024-10-24 ENCOUNTER — TELEPHONE (OUTPATIENT)
Dept: FAMILY MEDICINE | Facility: CLINIC | Age: 49
End: 2024-10-24
Payer: MEDICARE

## 2024-10-24 NOTE — TELEPHONE ENCOUNTER
Per my records, she had a prescription for atorvastatin 10 that she was taking. This was prescribed from someone else, though.

## 2024-10-24 NOTE — TELEPHONE ENCOUNTER
Shana Polanco MA       10/24/24  8:11 AM  Note  Called wife as instructed, no answer; LVM to return call to clinic or send portal message regards to DM medication     ----- Message from Giselle sent at 10/24/2024  8:02 AM CDT -----  [facility] Team,     Dr. Dixon      Pt said she spoke with her Psychologist and said they asked her why is she not on cholesterol meds. She as well  has concerns about her blood pressure it is 150/98. As well she needs a new colonoscopy order she missed her last appt.        Thank you,  Giselle

## 2024-10-24 NOTE — TELEPHONE ENCOUNTER
Called wife as instructed, no answer; LVM to return call to clinic or send portal message regards to DM medication    ----- Message from Giselle sent at 10/24/2024  8:02 AM CDT -----  [facility] Team,    Dr. Dixon     Pt said she spoke with her Psychologist and said they asked her why is she not on cholesterol meds. She as well  has concerns about her blood pressure it is 150/98. As well she needs a new colonoscopy order she missed her last appt.      Thank you,  Giselle

## 2024-10-24 NOTE — TELEPHONE ENCOUNTER
----- Message from Giselle sent at 10/24/2024  8:02 AM CDT -----  [facility],    Dr. Dixon     Pt said she spoke with her Psychologist and said they asked her why is she not on cholesterol meds. She as well  has concerns about her blood pressure it is 150/98. As well she needs a new colonoscopy order she missed her last appt.      Thank you,  Giselle

## 2024-10-28 ENCOUNTER — PATIENT MESSAGE (OUTPATIENT)
Dept: FAMILY MEDICINE | Facility: CLINIC | Age: 49
End: 2024-10-28
Payer: MEDICARE

## 2024-11-07 ENCOUNTER — TELEPHONE (OUTPATIENT)
Dept: ENDOSCOPY | Facility: HOSPITAL | Age: 49
End: 2024-11-07

## 2024-11-07 ENCOUNTER — CLINICAL SUPPORT (OUTPATIENT)
Dept: ENDOSCOPY | Facility: HOSPITAL | Age: 49
End: 2024-11-07
Attending: STUDENT IN AN ORGANIZED HEALTH CARE EDUCATION/TRAINING PROGRAM
Payer: MEDICARE

## 2024-11-07 VITALS — WEIGHT: 139 LBS | BODY MASS INDEX: 25.58 KG/M2 | HEIGHT: 62 IN

## 2024-11-07 DIAGNOSIS — Z12.11 COLON CANCER SCREENING: ICD-10-CM

## 2024-11-07 RX ORDER — SODIUM, POTASSIUM,MAG SULFATES 17.5-3.13G
1 SOLUTION, RECONSTITUTED, ORAL ORAL DAILY
Qty: 1 KIT | Refills: 0 | Status: SHIPPED | OUTPATIENT
Start: 2024-11-07 | End: 2024-11-09

## 2024-11-07 NOTE — TELEPHONE ENCOUNTER
Referral for procedure from PAT appointment      Spoke to pt to schedule procedure(s) Colonoscopy       Physician to perform procedure(s) Dr. JESSICA Saavedra  Date of Procedure (s) 12/18/24  Arrival Time 12:00 PM  Time of Procedure(s) 1:00 PM   Location of Procedure(s) [facility] [facility] Floor  Type of Rx Prep sent to patient: Suprep  Instructions provided to patient via Email    Patient was informed on the following information and verbalized understanding. Screening questionnaire reviewed with patient and complete. If procedure requires anesthesia, a responsible adult needs to be present to accompany the patient home, patient cannot drive after receiving anesthesia. Appointment details are tentative, especially check-in time. Patient will receive a prep-op call 7 days prior to confirm check-in time for procedure. If applicable the patient should contact their pharmacy to verify Rx for procedure prep is ready for pick-up. Patient was advised to call the scheduling department at [phone] if pharmacy states no Rx is available. Patient was advised to call the endoscopy scheduling department if any questions or concerns arise.      [facility] Endoscopy Scheduling Department

## 2024-12-16 ENCOUNTER — TELEPHONE (OUTPATIENT)
Dept: FAMILY MEDICINE | Facility: CLINIC | Age: 49
End: 2024-12-16
Payer: MEDICARE

## 2024-12-16 NOTE — TELEPHONE ENCOUNTER
----- Message from Paul sent at 12/16/2024 12:50 PM CST -----  Contact: pt  Type: Requesting to speak with nurse        Who Called: PT  Regarding: would like to know if she could be prescribe bp medication?  Would the patient rather a call back or a response via [facility]? Call back  Best Call Back Number: [phone]   Additional Information:

## 2025-02-21 ENCOUNTER — PATIENT MESSAGE (OUTPATIENT)
Dept: FAMILY MEDICINE | Facility: CLINIC | Age: 50
End: 2025-02-21

## 2025-02-21 ENCOUNTER — OFFICE VISIT (OUTPATIENT)
Dept: FAMILY MEDICINE | Facility: CLINIC | Age: 50
End: 2025-02-21
Payer: MEDICARE

## 2025-02-21 VITALS
HEART RATE: 87 BPM | WEIGHT: 138.88 LBS | HEIGHT: 62 IN | OXYGEN SATURATION: 95 % | DIASTOLIC BLOOD PRESSURE: 68 MMHG | TEMPERATURE: 98 F | BODY MASS INDEX: 25.55 KG/M2 | SYSTOLIC BLOOD PRESSURE: 110 MMHG

## 2025-02-21 DIAGNOSIS — Z12.11 COLON CANCER SCREENING: ICD-10-CM

## 2025-02-21 DIAGNOSIS — H47.20 OPTIC ATROPHY: Primary | ICD-10-CM

## 2025-02-21 DIAGNOSIS — Z12.31 SCREENING MAMMOGRAM FOR BREAST CANCER: ICD-10-CM

## 2025-02-21 DIAGNOSIS — F40.240 CLAUSTROPHOBIA: Primary | ICD-10-CM

## 2025-02-21 DIAGNOSIS — Q85.01 NEUROFIBROMATOSIS, TYPE I (VON RECKLINGHAUSEN'S DISEASE): ICD-10-CM

## 2025-02-21 DIAGNOSIS — H54.7 UNSPECIFIED VISUAL LOSS: ICD-10-CM

## 2025-02-21 DIAGNOSIS — F20.3 UNDIFFERENTIATED SCHIZOPHRENIA: ICD-10-CM

## 2025-02-21 PROCEDURE — 99214 OFFICE O/P EST MOD 30 MIN: CPT | Mod: S$GLB,,, | Performed by: PHYSICIAN ASSISTANT

## 2025-02-21 PROCEDURE — 1159F MED LIST DOCD IN RCRD: CPT | Mod: CPTII,S$GLB,, | Performed by: PHYSICIAN ASSISTANT

## 2025-02-21 PROCEDURE — 3078F DIAST BP <80 MM HG: CPT | Mod: CPTII,S$GLB,, | Performed by: PHYSICIAN ASSISTANT

## 2025-02-21 PROCEDURE — 3008F BODY MASS INDEX DOCD: CPT | Mod: CPTII,S$GLB,, | Performed by: PHYSICIAN ASSISTANT

## 2025-02-21 PROCEDURE — 3074F SYST BP LT 130 MM HG: CPT | Mod: CPTII,S$GLB,, | Performed by: PHYSICIAN ASSISTANT

## 2025-02-21 PROCEDURE — 1160F RVW MEDS BY RX/DR IN RCRD: CPT | Mod: CPTII,S$GLB,, | Performed by: PHYSICIAN ASSISTANT

## 2025-02-21 NOTE — PROGRESS NOTES
" Patient ID: Kenzie Saunders is a 49 y.o. female.     Chief Complaint: Eye Pain    Ms. Saunders is a 49 year old female with history of neurofibromatosis and schizophrenia who presents today for follow up of eye concerns    She reports recent episode of pink eye with associated pus and mucus discharge. She has astigmatism in left eye. She comes today with note from optometrist who suggests MRI of brain and orbits to rule out mass 2/2 optic atrophy in both eyes.    Eye Pain   Associated symptoms include blurred vision. Pertinent negatives include no eye discharge, fever, nausea, vomiting or weakness.       Review of Systems  Review of Systems   Constitutional:  Negative for fever.   HENT:  Negative for ear pain and sinus pain.    Eyes:  Positive for blurred vision and pain. Negative for discharge.   Respiratory:  Negative for cough and wheezing.    Cardiovascular:  Negative for chest pain and leg swelling.   Gastrointestinal:  Negative for diarrhea, nausea and vomiting.   Genitourinary:  Negative for urgency.   Musculoskeletal:  Negative for myalgias.   Skin:  Negative for rash.   Neurological:  Negative for weakness and headaches.   Psychiatric/Behavioral:  Negative for depression.        Currently Medications  Medications Ordered Prior to Encounter[1]    Physical  Exam  Vitals:    02/21/25 1306   BP: 110/68   BP Location: Left arm   Patient Position: Sitting   Pulse: 87   Temp: 98.1 °F (36.7 °C)   SpO2: 95%   Weight: 63 kg (138 lb 14.2 oz)   Height: 5' 2" (1.575 m)      Body mass index is 25.4 kg/m².  Wt Readings from Last 3 Encounters:   02/22/25 62.6 kg (138 lb)   02/21/25 63 kg (138 lb 14.2 oz)   11/07/24 63 kg (139 lb)       Physical Exam  Vitals and nursing note reviewed.   Constitutional:       General: She is not in acute distress.     Appearance: She is not ill-appearing.   HENT:      Head: Normocephalic and atraumatic.      Right Ear: External ear normal.      Left Ear: External ear normal.      Nose: " "Nose normal.      Mouth/Throat:      Mouth: Mucous membranes are moist.   Eyes:      Extraocular Movements: Extraocular movements intact.      Conjunctiva/sclera: Conjunctivae normal.   Cardiovascular:      Rate and Rhythm: Normal rate and regular rhythm.      Pulses: Normal pulses.      Heart sounds: No murmur heard.  Pulmonary:      Effort: Pulmonary effort is normal. No respiratory distress.      Breath sounds: No wheezing.   Abdominal:      General: There is no distension.      Palpations: Abdomen is soft. There is no mass.      Tenderness: There is no abdominal tenderness.   Musculoskeletal:         General: No swelling.      Cervical back: Normal range of motion.   Skin:     Coloration: Skin is not jaundiced.      Findings: No rash.      Comments: Multiple fibromas   Neurological:      General: No focal deficit present.      Mental Status: She is alert and oriented to person, place, and time.   Psychiatric:         Mood and Affect: Mood normal.         Thought Content: Thought content normal.         Labs:    Complete Blood Count  Lab Results   Component Value Date    RBC 4.71 03/11/2024    HGB 13.1 03/11/2024    HCT 40.4 03/11/2024    MCV 86 03/11/2024    MCH 27.8 03/11/2024    MCHC 32.4 03/11/2024    RDW 11.3 (L) 03/11/2024     03/11/2024    MPV 10.7 03/11/2024    GRAN 2.0 03/11/2024    GRAN 45.1 03/11/2024    LYMPH 1.9 03/11/2024    LYMPH 44.1 03/11/2024    MONO 0.3 03/11/2024    MONO 7.7 03/11/2024    EOS 0.1 03/11/2024    BASO 0.04 03/11/2024    EOSINOPHIL 2.0 03/11/2024    BASOPHIL 0.9 03/11/2024    DIFFMETHOD Automated 03/11/2024       Comprehensive Metabolic Panel  No results found for: "GLU", "BUN", "CREATININE", "NA", "K", "CL", "PROT", "ALBUMIN", "BILITOT", "AST", "ALKPHOS", "CO2", "ALT", "ANIONGAP", "EGFRNONAA", "ESTGFRAFRICA"    TSH  No results found for: "TSH"    Imaging:  MRI Lumbar Spine Without Contrast  Narrative: EXAMINATION:  MRI LUMBAR SPINE WITHOUT CONTRAST    CLINICAL " HISTORY:  Low back pain, symptoms persist with > 6wks conservative treatment; Dorsalgia, unspecified    TECHNIQUE:  Multiplanar, multisequence MR images were acquired from the thoracolumbar junction to the sacrum without contrast.    COMPARISON:  There is MRI lumbar spine 01/19/2018    FINDINGS:  Alignment: Levoscoliosis.    Vertebrae: Lumbar vertebral body heights maintained.  No abnormal osseous edema or evidence of an acute fracture.  Marrow signal is otherwise within normal limits.  There is partially visualized edema adjacent to the left sacroiliac joint.    Discs: Disc desiccation throughout the lumbar spine with L3-L4 disc height loss.    Cord: Within normal limits.  Conus terminates at T12-L1.  There is prominent dilatation of the thecal sac involving the sacrum, similar to the prior.    Degenerative findings:    T12-L1: Disc desiccation.  No significant disc bulge, spinal canal stenosis or neural foraminal stenosis.    L1-L2: Disc desiccation.  No significant disc bulge, spinal canal stenosis or neural foraminal stenosis.    L2-L3: No significant disc bulge, spinal canal stenosis or neural foraminal stenosis.    L3-L4: Disc desiccation and height loss.  Minor broad-based disc bulge and mild facet arthropathy with mild right-sided neural foraminal stenosis.  No significant spinal canal stenosis.    L4-L5: No significant disc bulge.  Mild facet arthropathy without significant spinal canal stenosis or neural foraminal stenosis.    L5-S1: No significant disc bulge.  Left greater than right facet arthropathy with a mild left-sided neural foraminal stenosis.  No significant spinal canal stenosis.    Paraspinal muscles & soft tissues: Nonspecific edema involving the paraspinal soft tissues adjacent to the left sacroiliac joint.  Partially visualized cysts or follicles within the left adnexa.  Impression: 1. Similar appearance of sacral dural ectasia, which can be seen in neurofibromatosis.  2. Nonspecific soft  tissue edema associated with the left sacroiliac joint.  Dedicated MR imaging of the pelvis could be utilized for additional evaluation as clinically indicated.  3. Mild multilevel degenerative changes of the lumbar spine with mild right-sided L3-L4 mild left-sided L5-S1 neural foraminal stenosis.  No significant lumbar spinal canal stenosis.    Electronically signed by: Javier Kurtz  Date:    05/26/2023  Time:    16:00      Assessment/Plan:    1. Optic atrophy  -     MRI Orbits With Contrast; Future; Expected date: 02/21/2025  -     MRI Brain W WO Contrast; Future; Expected date: 02/21/2025  -     CREATININE, SERUM; Future; Expected date: 02/21/2025    2. Unspecified visual loss  -     MRI Orbits With Contrast; Future; Expected date: 02/21/2025  -     MRI Brain W WO Contrast; Future; Expected date: 02/21/2025  -     CREATININE, SERUM; Future; Expected date: 02/21/2025    3. Neurofibromatosis, type I (von Recklinghausen's disease)  Assessment & Plan:  - Stable  - Continue to follow with neuro      4. Undifferentiated schizophrenia  Assessment & Plan:  - Chronic, stable  - Continue vraylar and remeron  - Follow with psych        5. Screening mammogram for breast cancer  -     Mammo Digital Screening Bilat w/ Tom (XPD); Future; Expected date: 02/21/2025    6. Colon cancer screening  -     Ambulatory referral/consult to Endo Procedure ; Future; Expected date: 02/22/2025       Assessment & Plan    IMPRESSION:  - Reviewed patient's recent eye exam findings, including concerns about optic nerve pressure and potential vision loss in the right eye  - Noted history of pituitary tumor and current astigmatism in left eye  - Considered possibility of new ocular phenomenon as mentioned by previous eye doctor  - Assessed need for further evaluation of optic nerve and vision changes  - Will contact eye doctor at [facility] for additional information if needed    NEUROFIBROMATOSIS, TYPE I (VON  RECKLINGHAUSEN'S DISEASE):  - Ordered MRI scan to evaluate the patient's condition, particularly to assess pressure on the optic nerve and potential recurrence of a previous pituitary tumor.  - Ms. Saunders reports some loss of sight, with the optometrist identifying involvement in the right eye through fundus photographs.  - Prescribed eye drops for 5 days.  - Miss Anderson instructed to arrange MRI and other necessary tests.  - Scheduled follow-up visit with the optometrist and will contact them for more detailed information about their concerns and findings.    CONJUNCTIVITIS:  - Continue eye drops for 5 days as prescribed by previous optometrist for conjunctivitis with purulent or mucoid discharge.    ASTIGMATISM:  - Ms. Saunders has astigmatism in the left eye.    BREAST CANCER SCREENING:  - Ordered mammogram for breast cancer screening.          Discussed how to stay healthy including: diet, exercise, refraining from smoking and discussed screening exams / tests needed for age, sex and family Hx.    This note was generated with the assistance of ambient listening technology. Verbal consent was obtained by the patient and accompanying visitor(s) for the recording of patient appointment to facilitate this note. I attest to having reviewed and edited the generated note for accuracy, though some syntax or spelling errors may persist. Please contact the author of this note for any clarification.       RTC following MRI    Dara Sexton PA-C             [1]   Current Outpatient Medications on File Prior to Visit   Medication Sig Dispense Refill    meclizine (ANTIVERT) 25 mg tablet Take 1 tablet (25 mg total) by mouth 3 (three) times daily as needed for Dizziness. 45 tablet 1    mirtazapine (REMERON) 15 MG tablet TAKE 1 TABLET BY MOUTH EVERY DAY AT BEDTIME FOR 30 DAYS      VRAYLAR 3 mg Cap Take 3 mg by mouth.      atorvastatin (LIPITOR) 10 MG tablet Take 1 tablet (10 mg total) by mouth once daily. 90 tablet 3     No  current facility-administered medications on file prior to visit.

## 2025-02-22 ENCOUNTER — TELEPHONE (OUTPATIENT)
Dept: ENDOSCOPY | Facility: HOSPITAL | Age: 50
End: 2025-02-22

## 2025-02-22 ENCOUNTER — CLINICAL SUPPORT (OUTPATIENT)
Dept: ENDOSCOPY | Facility: HOSPITAL | Age: 50
End: 2025-02-22
Payer: MEDICARE

## 2025-02-22 VITALS — BODY MASS INDEX: 25.4 KG/M2 | HEIGHT: 62 IN | WEIGHT: 138 LBS

## 2025-02-22 DIAGNOSIS — Z12.11 COLON CANCER SCREENING: ICD-10-CM

## 2025-02-22 NOTE — TELEPHONE ENCOUNTER
Referral for procedure from PAT appointment      Spoke to patient to schedule procedure(s) Colonoscopy       Physician to perform procedure(s) Dr. MADONNA Brewer  Date of Procedure (s) 3/17/25  Arrival Time 12:30 PM  Time of Procedure(s) 1:30 PM   Location of Procedure(s) [place] [address]nd Floor  Type of Rx Prep sent to patient: Suprep  Instructions provided to patient via [facility]    Patient was informed on the following information and verbalized understanding. Screening questionnaire reviewed with patient and complete. If procedure requires anesthesia, a responsible adult needs to be present to accompany the patient home, patient cannot drive after receiving anesthesia. Appointment details are tentative, especially check-in time. Patient will receive a prep-op call 7 days prior to confirm check-in time for procedure. If applicable the patient should contact their pharmacy to verify Rx for procedure prep is ready for pick-up. Patient was advised to call the scheduling department at [phone] if pharmacy states no Rx is available. Patient was advised to call the endoscopy scheduling department if any questions or concerns arise.      [facility] Endoscopy Scheduling Department

## 2025-02-24 RX ORDER — DIAZEPAM 5 MG/1
5 TABLET ORAL ONCE
Qty: 1 TABLET | Refills: 0 | Status: SHIPPED | OUTPATIENT
Start: 2025-02-24 | End: 2025-02-24

## 2025-03-06 ENCOUNTER — HOSPITAL ENCOUNTER (OUTPATIENT)
Dept: RADIOLOGY | Facility: HOSPITAL | Age: 50
Discharge: HOME | End: 2025-03-06
Attending: PHYSICIAN ASSISTANT
Payer: MEDICARE

## 2025-03-06 ENCOUNTER — RESULTS FOLLOW-UP (OUTPATIENT)
Dept: FAMILY MEDICINE | Facility: CLINIC | Age: 50
End: 2025-03-06

## 2025-03-06 ENCOUNTER — TELEPHONE (OUTPATIENT)
Dept: CARDIOLOGY | Facility: CLINIC | Age: 50
End: 2025-03-06
Payer: MEDICARE

## 2025-03-06 DIAGNOSIS — H54.7 UNSPECIFIED VISUAL LOSS: ICD-10-CM

## 2025-03-06 DIAGNOSIS — H47.20 OPTIC ATROPHY: ICD-10-CM

## 2025-03-06 PROCEDURE — 70553 MRI BRAIN STEM W/O & W/DYE: CPT | Mod: TC,PN

## 2025-03-06 PROCEDURE — 70542 MRI ORBIT/FACE/NECK W/DYE: CPT | Mod: TC,PN

## 2025-03-06 PROCEDURE — 25500020 PHARM REV CODE 255: Mod: PN | Performed by: PHYSICIAN ASSISTANT

## 2025-03-06 PROCEDURE — A9585 GADOBUTROL INJECTION: HCPCS | Mod: PN | Performed by: PHYSICIAN ASSISTANT

## 2025-03-06 PROCEDURE — 70553 MRI BRAIN STEM W/O & W/DYE: CPT | Mod: 26,,, | Performed by: RADIOLOGY

## 2025-03-06 PROCEDURE — 70542 MRI ORBIT/FACE/NECK W/DYE: CPT | Mod: 26,,, | Performed by: RADIOLOGY

## 2025-03-06 RX ORDER — GADOBUTROL 604.72 MG/ML
6 INJECTION INTRAVENOUS
Status: COMPLETED | OUTPATIENT
Start: 2025-03-06 | End: 2025-03-06

## 2025-03-06 RX ADMIN — GADOBUTROL 6 ML: 604.72 INJECTION INTRAVENOUS at 02:03

## 2025-03-06 NOTE — TELEPHONE ENCOUNTER
Called pt to schedule her an office visit with Yvan pt mother said she is at [facility] having a procedure done to call back tomorrow. I advised pt mother at her earliest convenience please give the office a call rachael we can make that appointment for her. Also sent pt a my chart message letting her know I called to help schedule her an appt.            ----- Message from Elsa sent at 3/6/2025 11:55 AM CST -----  Type:  Sooner Apoointment RequestCaller is requesting a sooner appointment.  Caller declined first available appointment listed below.  Caller will not accept being placed on the waitlist and is requesting a message be sent to doctor.Name of Caller: PtWhen is the first available appointment?Symptoms: b/pWould the patient rather a call back or a response via [facility]? CallBe Call Back Number:  [phone] Information: Pt would like to see provider in office regarding b/p readings

## 2025-03-13 ENCOUNTER — TELEPHONE (OUTPATIENT)
Dept: ENDOSCOPY | Facility: HOSPITAL | Age: 50
End: 2025-03-13
Payer: MEDICARE

## 2025-06-30 ENCOUNTER — TELEPHONE (OUTPATIENT)
Dept: CARDIOLOGY | Facility: CLINIC | Age: 50
End: 2025-06-30
Payer: MEDICARE

## 2025-07-14 ENCOUNTER — LAB VISIT (OUTPATIENT)
Dept: LAB | Facility: HOSPITAL | Age: 50
End: 2025-07-14
Attending: PSYCHIATRY & NEUROLOGY
Payer: MEDICARE

## 2025-07-14 DIAGNOSIS — F31.12 BIPOLAR I DISORDER, MOST RECENT EPISODE (OR CURRENT) MANIC, MODERATE: Primary | ICD-10-CM

## 2025-07-14 DIAGNOSIS — Z79.899 OTHER LONG TERM (CURRENT) DRUG THERAPY: ICD-10-CM

## 2025-07-14 LAB
ABSOLUTE EOSINOPHIL (OHS): 0.05 K/UL
ABSOLUTE MONOCYTE (OHS): 0.48 K/UL (ref 0.3–1)
ABSOLUTE NEUTROPHIL COUNT (OHS): 2.95 K/UL (ref 1.8–7.7)
ALBUMIN SERPL BCP-MCNC: 4 G/DL (ref 3.5–5.2)
ALP SERPL-CCNC: 80 UNIT/L (ref 38–126)
ALT SERPL W/O P-5'-P-CCNC: 9 UNIT/L (ref 10–44)
ANION GAP (OHS): 7 MMOL/L (ref 8–16)
AST SERPL-CCNC: 19 UNIT/L (ref 15–46)
BASOPHILS # BLD AUTO: 0.03 K/UL
BASOPHILS NFR BLD AUTO: 0.6 %
BILIRUB SERPL-MCNC: 0.6 MG/DL (ref 0.1–1)
BUN SERPL-MCNC: 4 MG/DL (ref 7–17)
CALCIUM SERPL-MCNC: 8.7 MG/DL (ref 8.7–10.5)
CHLORIDE SERPL-SCNC: 106 MMOL/L (ref 95–110)
CHOLEST SERPL-MCNC: 228 MG/DL (ref 120–199)
CHOLEST/HDLC SERPL: 4 {RATIO} (ref 2–5)
CO2 SERPL-SCNC: 27 MMOL/L (ref 23–29)
CREAT SERPL-MCNC: 0.5 MG/DL (ref 0.5–1.4)
EAG (OHS): 97 MG/DL (ref 68–131)
ERYTHROCYTE [DISTWIDTH] IN BLOOD BY AUTOMATED COUNT: 11.9 % (ref 11.5–14.5)
GFR SERPLBLD CREATININE-BSD FMLA CKD-EPI: >60 ML/MIN/1.73/M2
GLUCOSE SERPL-MCNC: 82 MG/DL (ref 70–110)
HBA1C MFR BLD: 5 % (ref 4–5.6)
HCT VFR BLD AUTO: 38.1 % (ref 37–48.5)
HDLC SERPL-MCNC: 57 MG/DL (ref 40–75)
HDLC SERPL: 25 % (ref 20–50)
HGB BLD-MCNC: 12.6 GM/DL (ref 12–16)
IMM GRANULOCYTES # BLD AUTO: 0.02 K/UL (ref 0–0.04)
IMM GRANULOCYTES NFR BLD AUTO: 0.4 % (ref 0–0.5)
LDLC SERPL CALC-MCNC: 152 MG/DL (ref 63–159)
LYMPHOCYTES # BLD AUTO: 1.77 K/UL (ref 1–4.8)
MCH RBC QN AUTO: 28 PG (ref 27–31)
MCHC RBC AUTO-ENTMCNC: 33.1 G/DL (ref 32–36)
MCV RBC AUTO: 85 FL (ref 82–98)
NONHDLC SERPL-MCNC: 171 MG/DL
NUCLEATED RBC (/100WBC) (OHS): 0 /100 WBC
PLATELET # BLD AUTO: 294 K/UL (ref 150–450)
PMV BLD AUTO: 10.4 FL (ref 9.2–12.9)
POTASSIUM SERPL-SCNC: 4.1 MMOL/L (ref 3.5–5.1)
PROT SERPL-MCNC: 7.4 GM/DL (ref 6–8.4)
RBC # BLD AUTO: 4.5 M/UL (ref 4–5.4)
RELATIVE EOSINOPHIL (OHS): 0.9 %
RELATIVE LYMPHOCYTE (OHS): 33.4 % (ref 18–48)
RELATIVE MONOCYTE (OHS): 9.1 % (ref 4–15)
RELATIVE NEUTROPHIL (OHS): 55.6 % (ref 38–73)
SODIUM SERPL-SCNC: 140 MMOL/L (ref 136–145)
TRIGL SERPL-MCNC: 95 MG/DL (ref 30–150)
WBC # BLD AUTO: 5.3 K/UL (ref 3.9–12.7)

## 2025-07-14 PROCEDURE — 85025 COMPLETE CBC W/AUTO DIFF WBC: CPT | Mod: PN

## 2025-07-14 PROCEDURE — 83036 HEMOGLOBIN GLYCOSYLATED A1C: CPT | Mod: PN

## 2025-07-14 PROCEDURE — 80061 LIPID PANEL: CPT | Mod: PN

## 2025-07-14 PROCEDURE — 80053 COMPREHEN METABOLIC PANEL: CPT | Mod: PN

## 2025-07-14 PROCEDURE — 36415 COLL VENOUS BLD VENIPUNCTURE: CPT | Mod: PN

## 2025-07-15 ENCOUNTER — TELEPHONE (OUTPATIENT)
Dept: FAMILY MEDICINE | Facility: CLINIC | Age: 50
End: 2025-07-15
Payer: MEDICARE

## 2025-07-15 NOTE — TELEPHONE ENCOUNTER
Copied from CRM #[id number]. Topic: General Inquiry - Patient Advice  >> Jul 15, 2025 10:24 AM Юлия wrote:  Type: Patient Call Back    Who called: Patient     What is the request in detail: Pt is requesting a call back in regards to being prescribed something for her cholesterol being high according to the blood work the pt did on yesterday 7/14. Please Advise     Can the clinic reply by [facility]? No     Would the patient rather a call back or a response via [facility]? Call    Best call back number: [phone]      Additional Information:

## 2025-07-15 NOTE — TELEPHONE ENCOUNTER
Attempted to contact pt regarding scheduling a virtual. No answer and unable to lvm, vm not set up

## 2025-07-16 PROBLEM — E78.2 MODERATE MIXED HYPERLIPIDEMIA NOT REQUIRING STATIN THERAPY: Status: ACTIVE | Noted: 2017-03-27

## 2025-07-16 PROBLEM — E78.5 DYSLIPIDEMIA: Status: RESOLVED | Noted: 2020-11-03 | Resolved: 2025-07-16

## 2025-08-02 ENCOUNTER — PATIENT MESSAGE (OUTPATIENT)
Dept: ADMINISTRATIVE | Facility: OTHER | Age: 50
End: 2025-08-02
Payer: MEDICARE

## 2025-08-13 ENCOUNTER — NURSE TRIAGE (OUTPATIENT)
Dept: ADMINISTRATIVE | Facility: CLINIC | Age: 50
End: 2025-08-13
Payer: MEDICARE

## 2025-08-13 ENCOUNTER — HOSPITAL ENCOUNTER (EMERGENCY)
Facility: HOSPITAL | Age: 50
Discharge: HOME | End: 2025-08-13
Attending: EMERGENCY MEDICINE
Payer: MEDICARE

## 2025-08-13 VITALS
RESPIRATION RATE: 16 BRPM | TEMPERATURE: 98 F | SYSTOLIC BLOOD PRESSURE: 145 MMHG | WEIGHT: 143 LBS | BODY MASS INDEX: 26.31 KG/M2 | HEART RATE: 66 BPM | HEIGHT: 62 IN | DIASTOLIC BLOOD PRESSURE: 86 MMHG | OXYGEN SATURATION: 100 %

## 2025-08-13 DIAGNOSIS — R07.9 CHEST PAIN: ICD-10-CM

## 2025-08-13 LAB
ABSOLUTE EOSINOPHIL (OHS): 0.17 K/UL
ABSOLUTE MONOCYTE (OHS): 0.47 K/UL (ref 0.3–1)
ABSOLUTE NEUTROPHIL COUNT (OHS): 2.91 K/UL (ref 1.8–7.7)
ALBUMIN SERPL BCP-MCNC: 4.1 G/DL (ref 3.5–5.2)
ALP SERPL-CCNC: 71 UNIT/L (ref 38–126)
ALT SERPL W/O P-5'-P-CCNC: 11 UNIT/L (ref 10–44)
ANION GAP (OHS): 6 MMOL/L (ref 8–16)
AST SERPL-CCNC: 18 UNIT/L (ref 15–46)
BASOPHILS # BLD AUTO: 0.04 K/UL
BASOPHILS NFR BLD AUTO: 0.7 %
BILIRUB SERPL-MCNC: 0.7 MG/DL (ref 0.1–1)
BUN SERPL-MCNC: 9 MG/DL (ref 7–17)
CALCIUM SERPL-MCNC: 8.7 MG/DL (ref 8.7–10.5)
CHLORIDE SERPL-SCNC: 104 MMOL/L (ref 95–110)
CO2 SERPL-SCNC: 28 MMOL/L (ref 23–29)
CREAT SERPL-MCNC: 0.6 MG/DL (ref 0.5–1.4)
ERYTHROCYTE [DISTWIDTH] IN BLOOD BY AUTOMATED COUNT: 11.9 % (ref 11.5–14.5)
GFR SERPLBLD CREATININE-BSD FMLA CKD-EPI: >60 ML/MIN/1.73/M2
GLUCOSE SERPL-MCNC: 80 MG/DL (ref 70–110)
HCT VFR BLD AUTO: 37.9 % (ref 37–48.5)
HGB BLD-MCNC: 12.8 GM/DL (ref 12–16)
IMM GRANULOCYTES # BLD AUTO: 0.01 K/UL (ref 0–0.04)
IMM GRANULOCYTES NFR BLD AUTO: 0.2 % (ref 0–0.5)
LYMPHOCYTES # BLD AUTO: 2.17 K/UL (ref 1–4.8)
MAGNESIUM SERPL-MCNC: 1.6 MG/DL (ref 1.6–2.6)
MCH RBC QN AUTO: 28.5 PG (ref 27–31)
MCHC RBC AUTO-ENTMCNC: 33.8 G/DL (ref 32–36)
MCV RBC AUTO: 84 FL (ref 82–98)
NUCLEATED RBC (/100WBC) (OHS): 0 /100 WBC
OHS QRS DURATION: 124 MS
OHS QTC CALCULATION: 428 MS
PLATELET # BLD AUTO: 283 K/UL (ref 150–450)
PMV BLD AUTO: 10 FL (ref 9.2–12.9)
POTASSIUM SERPL-SCNC: 4.2 MMOL/L (ref 3.5–5.1)
PROT SERPL-MCNC: 7.5 GM/DL (ref 6–8.4)
RBC # BLD AUTO: 4.49 M/UL (ref 4–5.4)
RELATIVE EOSINOPHIL (OHS): 2.9 %
RELATIVE LYMPHOCYTE (OHS): 37.6 % (ref 18–48)
RELATIVE MONOCYTE (OHS): 8.1 % (ref 4–15)
RELATIVE NEUTROPHIL (OHS): 50.5 % (ref 38–73)
SODIUM SERPL-SCNC: 138 MMOL/L (ref 136–145)
TROPONIN I SERPL DL<=0.01 NG/ML-MCNC: <0.012 NG/ML (ref 0–0.03)
TROPONIN I SERPL DL<=0.01 NG/ML-MCNC: <0.012 NG/ML (ref 0–0.03)
WBC # BLD AUTO: 5.77 K/UL (ref 3.9–12.7)

## 2025-08-13 PROCEDURE — 25000003 PHARM REV CODE 250: Mod: ER | Performed by: EMERGENCY MEDICINE

## 2025-08-13 PROCEDURE — 96375 TX/PRO/DX INJ NEW DRUG ADDON: CPT | Mod: ER

## 2025-08-13 PROCEDURE — 84075 ASSAY ALKALINE PHOSPHATASE: CPT | Mod: ER | Performed by: EMERGENCY MEDICINE

## 2025-08-13 PROCEDURE — 99285 EMERGENCY DEPT VISIT HI MDM: CPT | Mod: 25,ER

## 2025-08-13 PROCEDURE — 96374 THER/PROPH/DIAG INJ IV PUSH: CPT | Mod: ER

## 2025-08-13 PROCEDURE — 93005 ELECTROCARDIOGRAM TRACING: CPT | Mod: ER

## 2025-08-13 PROCEDURE — 83735 ASSAY OF MAGNESIUM: CPT | Mod: ER | Performed by: EMERGENCY MEDICINE

## 2025-08-13 PROCEDURE — 63600175 PHARM REV CODE 636 W HCPCS: Mod: JZ,TB,ER | Performed by: EMERGENCY MEDICINE

## 2025-08-13 PROCEDURE — 93010 ELECTROCARDIOGRAM REPORT: CPT | Mod: ,,, | Performed by: INTERNAL MEDICINE

## 2025-08-13 PROCEDURE — 84484 ASSAY OF TROPONIN QUANT: CPT | Mod: ER | Performed by: EMERGENCY MEDICINE

## 2025-08-13 PROCEDURE — 85025 COMPLETE CBC W/AUTO DIFF WBC: CPT | Mod: ER | Performed by: EMERGENCY MEDICINE

## 2025-08-13 PROCEDURE — 99900035 HC TECH TIME PER 15 MIN (STAT): Mod: ER

## 2025-08-13 RX ORDER — LIDOCAINE HYDROCHLORIDE 20 MG/ML
15 SOLUTION OROPHARYNGEAL ONCE
Status: COMPLETED | OUTPATIENT
Start: 2025-08-13 | End: 2025-08-13

## 2025-08-13 RX ORDER — ALUMINUM HYDROXIDE, MAGNESIUM HYDROXIDE, AND SIMETHICONE 1200; 120; 1200 MG/30ML; MG/30ML; MG/30ML
30 SUSPENSION ORAL ONCE
Status: COMPLETED | OUTPATIENT
Start: 2025-08-13 | End: 2025-08-13

## 2025-08-13 RX ORDER — METOCLOPRAMIDE HYDROCHLORIDE 5 MG/ML
10 INJECTION INTRAMUSCULAR; INTRAVENOUS
Status: COMPLETED | OUTPATIENT
Start: 2025-08-13 | End: 2025-08-13

## 2025-08-13 RX ORDER — KETOROLAC TROMETHAMINE 30 MG/ML
15 INJECTION, SOLUTION INTRAMUSCULAR; INTRAVENOUS
Status: COMPLETED | OUTPATIENT
Start: 2025-08-13 | End: 2025-08-13

## 2025-08-13 RX ADMIN — METOCLOPRAMIDE 10 MG: 5 INJECTION, SOLUTION INTRAMUSCULAR; INTRAVENOUS at 02:08

## 2025-08-13 RX ADMIN — KETOROLAC TROMETHAMINE 15 MG: 30 INJECTION, SOLUTION INTRAMUSCULAR; INTRAVENOUS at 02:08

## 2025-08-13 RX ADMIN — ALUMINUM HYDROXIDE, MAGNESIUM HYDROXIDE, AND SIMETHICONE 30 ML: 200; 200; 20 SUSPENSION ORAL at 02:08

## 2025-08-13 RX ADMIN — LIDOCAINE HYDROCHLORIDE 15 ML: 20 SOLUTION ORAL at 02:08

## 2025-08-14 ENCOUNTER — OFFICE VISIT (OUTPATIENT)
Dept: FAMILY MEDICINE | Facility: CLINIC | Age: 50
End: 2025-08-14
Payer: MEDICARE

## 2025-08-14 VITALS
WEIGHT: 144.5 LBS | SYSTOLIC BLOOD PRESSURE: 116 MMHG | HEIGHT: 62 IN | OXYGEN SATURATION: 98 % | TEMPERATURE: 98 F | BODY MASS INDEX: 26.59 KG/M2 | DIASTOLIC BLOOD PRESSURE: 78 MMHG | HEART RATE: 81 BPM

## 2025-08-14 DIAGNOSIS — Q85.01 NEUROFIBROMATOSIS, TYPE I (VON RECKLINGHAUSEN'S DISEASE): ICD-10-CM

## 2025-08-14 DIAGNOSIS — Z12.12 ENCOUNTER FOR SCREENING FOR COLORECTAL MALIGNANT NEOPLASM: ICD-10-CM

## 2025-08-14 DIAGNOSIS — Z12.11 ENCOUNTER FOR SCREENING FOR COLORECTAL MALIGNANT NEOPLASM: ICD-10-CM

## 2025-08-14 DIAGNOSIS — Z12.31 ENCOUNTER FOR SCREENING MAMMOGRAM FOR MALIGNANT NEOPLASM OF BREAST: ICD-10-CM

## 2025-08-14 DIAGNOSIS — R07.9 CHEST PAIN AT REST: Primary | ICD-10-CM

## 2025-08-14 DIAGNOSIS — F31.9 BIPOLAR AFFECTIVE DISORDER, REMISSION STATUS UNSPECIFIED: ICD-10-CM

## 2025-08-14 LAB
OHS QRS DURATION: 112 MS
OHS QTC CALCULATION: 434 MS

## 2025-08-14 PROCEDURE — 1160F RVW MEDS BY RX/DR IN RCRD: CPT | Mod: CPTII,S$GLB,, | Performed by: PHYSICIAN ASSISTANT

## 2025-08-14 PROCEDURE — 3074F SYST BP LT 130 MM HG: CPT | Mod: CPTII,S$GLB,, | Performed by: PHYSICIAN ASSISTANT

## 2025-08-14 PROCEDURE — 3008F BODY MASS INDEX DOCD: CPT | Mod: CPTII,S$GLB,, | Performed by: PHYSICIAN ASSISTANT

## 2025-08-14 PROCEDURE — 1159F MED LIST DOCD IN RCRD: CPT | Mod: CPTII,S$GLB,, | Performed by: PHYSICIAN ASSISTANT

## 2025-08-14 PROCEDURE — 3078F DIAST BP <80 MM HG: CPT | Mod: CPTII,S$GLB,, | Performed by: PHYSICIAN ASSISTANT

## 2025-08-14 PROCEDURE — 99215 OFFICE O/P EST HI 40 MIN: CPT | Mod: S$GLB,,, | Performed by: PHYSICIAN ASSISTANT

## 2025-08-14 PROCEDURE — 3044F HG A1C LEVEL LT 7.0%: CPT | Mod: CPTII,S$GLB,, | Performed by: PHYSICIAN ASSISTANT

## 2025-08-14 RX ORDER — MIRTAZAPINE 7.5 MG/1
7.5 TABLET, FILM COATED ORAL NIGHTLY
COMMUNITY
Start: 2025-05-20

## 2025-08-14 RX ORDER — FAMOTIDINE 40 MG/1
40 TABLET, FILM COATED ORAL DAILY
Qty: 30 TABLET | Refills: 11 | Status: SHIPPED | OUTPATIENT
Start: 2025-08-14 | End: 2026-08-14

## 2025-08-14 RX ORDER — CYCLOSPORINE 0.5 MG/ML
1 EMULSION OPHTHALMIC DAILY
COMMUNITY
Start: 2025-02-19

## 2025-08-15 ENCOUNTER — OFFICE VISIT (OUTPATIENT)
Dept: CARDIOLOGY | Facility: CLINIC | Age: 50
End: 2025-08-15
Payer: MEDICARE

## 2025-08-15 DIAGNOSIS — I10 PRIMARY HYPERTENSION: ICD-10-CM

## 2025-08-15 DIAGNOSIS — R07.2 PRECORDIAL CHEST PAIN: Primary | ICD-10-CM

## 2025-08-15 DIAGNOSIS — E78.2 MIXED HYPERLIPIDEMIA: ICD-10-CM

## 2025-08-15 RX ORDER — PROPRANOLOL HYDROCHLORIDE 10 MG/1
10 TABLET ORAL 2 TIMES DAILY
Qty: 60 TABLET | Refills: 11 | Status: SHIPPED | OUTPATIENT
Start: 2025-08-15 | End: 2026-08-15

## 2025-08-25 ENCOUNTER — TELEPHONE (OUTPATIENT)
Dept: ENDOSCOPY | Facility: HOSPITAL | Age: 50
End: 2025-08-25

## 2025-08-25 ENCOUNTER — CLINICAL SUPPORT (OUTPATIENT)
Dept: ENDOSCOPY | Facility: HOSPITAL | Age: 50
End: 2025-08-25
Payer: MEDICARE

## 2025-08-25 DIAGNOSIS — Z12.11 SPECIAL SCREENING FOR MALIGNANT NEOPLASMS, COLON: Primary | ICD-10-CM

## 2025-08-26 ENCOUNTER — HOSPITAL ENCOUNTER (OUTPATIENT)
Dept: CARDIOLOGY | Facility: HOSPITAL | Age: 50
Discharge: HOME | End: 2025-08-26
Attending: STUDENT IN AN ORGANIZED HEALTH CARE EDUCATION/TRAINING PROGRAM
Payer: MEDICARE

## 2025-08-26 ENCOUNTER — HOSPITAL ENCOUNTER (OUTPATIENT)
Dept: RADIOLOGY | Facility: HOSPITAL | Age: 50
Discharge: HOME | End: 2025-08-26
Attending: STUDENT IN AN ORGANIZED HEALTH CARE EDUCATION/TRAINING PROGRAM
Payer: MEDICARE

## 2025-08-26 ENCOUNTER — PATIENT MESSAGE (OUTPATIENT)
Dept: CARDIOLOGY | Facility: CLINIC | Age: 50
End: 2025-08-26

## 2025-08-26 DIAGNOSIS — R07.2 PRECORDIAL CHEST PAIN: ICD-10-CM

## 2025-08-26 LAB
APICAL FOUR CHAMBER EJECTION FRACTION: 60 %
APICAL TWO CHAMBER EJECTION FRACTION: 68 %
ASCENDING AORTA: 2.9 CM
AV INDEX (PROSTH): 0.78
AV MEAN GRADIENT: 3 MMHG
AV PEAK GRADIENT: 5 MMHG
AV VALVE AREA BY VELOCITY RATIO: 2.5 CM²
AV VALVE AREA: 2.7 CM²
AV VELOCITY RATIO: 0.73
CV ECHO LV RWT: 0.29 CM
CV STRESS BASE HR: 51 BPM
DIASTOLIC BLOOD PRESSURE: 85 MMHG
DOP CALC AO PEAK VEL: 1.1 M/S
DOP CALC AO VTI: 20.6 CM
DOP CALC LVOT AREA: 3.5 CM2
DOP CALC LVOT DIAMETER: 2.1 CM
DOP CALC LVOT PEAK VEL: 0.8 M/S
DOP CALC MV VTI: 23.2 CM
DOP CALCLVOT PEAK VEL VTI: 16 CM
E WAVE DECELERATION TIME: 193 MSEC
E/A RATIO: 0.84
ECHO LV POSTERIOR WALL: 0.6 CM (ref 0.6–1.1)
FRACTIONAL SHORTENING: 28.6 % (ref 28–44)
INTERVENTRICULAR SEPTUM: 0.8 CM (ref 0.6–1.1)
IVRT: 88 MSEC
LEFT ATRIUM AREA SYSTOLIC (APICAL 2 CHAMBER): 10.99 CM2
LEFT ATRIUM AREA SYSTOLIC (APICAL 4 CHAMBER): 9.93 CM2
LEFT ATRIUM VOLUME MOD: 21 ML
LEFT INTERNAL DIMENSION IN SYSTOLE: 3 CM (ref 2.1–4)
LEFT VENTRICLE DIASTOLIC VOLUME: 80 ML
LEFT VENTRICLE END DIASTOLIC VOLUME APICAL 2 CHAMBER: 62.13 ML
LEFT VENTRICLE END DIASTOLIC VOLUME APICAL 4 CHAMBER: 74.31 ML
LEFT VENTRICLE END SYSTOLIC VOLUME APICAL 2 CHAMBER: 24.35 ML
LEFT VENTRICLE END SYSTOLIC VOLUME APICAL 4 CHAMBER: 18.49 ML
LEFT VENTRICLE SYSTOLIC VOLUME: 34 ML
LEFT VENTRICULAR INTERNAL DIMENSION IN DIASTOLE: 4.2 CM (ref 3.5–6)
LEFT VENTRICULAR MASS: 85.1 G
LV LATERAL E/E' RATIO: 6.1 M/S
LVED V (TEICH): 80.03 ML
LVES V (TEICH): 33.74 ML
LVOT MG: 1.56 MMHG
LVOT MV: 0.6 CM/S
MV A" WAVE DURATION": 79.92 MSEC
MV MEAN GRADIENT: 1 MMHG
MV PEAK A VEL: 0.73 M/S
MV PEAK E VEL: 0.61 M/S
MV PEAK GRADIENT: 3 MMHG
MV STENOSIS PRESSURE HALF TIME: 55.92 MS
MV VALVE AREA BY CONTINUITY EQUATION: 2.39 CM2
MV VALVE AREA P 1/2 METHOD: 3.93 CM2
OHS CV CPX 85 PERCENT MAX PREDICTED HEART RATE MALE: 145
OHS CV CPX MAX PREDICTED HEART RATE: 170
OHS CV CPX PATIENT IS FEMALE: 1
OHS CV CPX PATIENT IS MALE: 0
OHS CV CPX PEAK DIASTOLIC BLOOD PRESSURE: 78 MMHG
OHS CV CPX PEAK HEAR RATE: 110 BPM
OHS CV CPX PEAK RATE PRESSURE PRODUCT: NORMAL
OHS CV CPX PEAK SYSTOLIC BLOOD PRESSURE: 139 MMHG
OHS CV CPX PERCENT MAX PREDICTED HEART RATE ACHIEVED: 68
OHS CV CPX RATE PRESSURE PRODUCT PRESENTING: 6834
OHS CV RV/LV RATIO: 0.81 CM
OHS LV EJECTION FRACTION SIMPSONS BIPLANE MOD: 65 %
PISA MRMAX VEL: 3.38 M/S
PISA TR MAX VEL: 1.5 M/S
PULM VEIN S/D RATIO: 1.08
PV PEAK D VEL: 0.52 M/S
PV PEAK GRADIENT: 4 MMHG
PV PEAK S VEL: 0.56 M/S
PV PEAK VELOCITY: 1.04 M/S
RA PRESSURE ESTIMATED: 3 MMHG
RA VOL SYS: 14.54 ML
RIGHT ATRIAL AREA: 8.3 CM2
RIGHT ATRIUM VOLUME AREA LENGTH APICAL 4 CHAMBER: 13.94 ML
RIGHT VENTRICLE DIASTOLIC BASEL DIMENSION: 3.4 CM
RV TB RVSP: 5 MMHG
RV TISSUE DOPPLER FREE WALL SYSTOLIC VELOCITY 1 (APICAL 4 CHAMBER VIEW): 10.26 CM/S
SINUS: 2.7 CM
STJ: 2.7 CM
SYSTOLIC BLOOD PRESSURE: 134 MMHG
TDI LATERAL: 0.1 M/S
TR MAX PG: 9 MMHG
TRICUSPID ANNULAR PLANE SYSTOLIC EXCURSION: 2.4 CM
TV REST PULMONARY ARTERY PRESSURE: 12 MMHG

## 2025-08-26 PROCEDURE — A9502 TC99M TETROFOSMIN: HCPCS | Mod: PN | Performed by: STUDENT IN AN ORGANIZED HEALTH CARE EDUCATION/TRAINING PROGRAM

## 2025-08-26 PROCEDURE — 63600175 PHARM REV CODE 636 W HCPCS: Mod: PN | Performed by: STUDENT IN AN ORGANIZED HEALTH CARE EDUCATION/TRAINING PROGRAM

## 2025-08-26 PROCEDURE — 78452 HT MUSCLE IMAGE SPECT MULT: CPT | Mod: TC,PN

## 2025-08-26 PROCEDURE — 93306 TTE W/DOPPLER COMPLETE: CPT | Mod: PN

## 2025-08-26 PROCEDURE — 93306 TTE W/DOPPLER COMPLETE: CPT | Mod: 26,,, | Performed by: INTERNAL MEDICINE

## 2025-08-26 PROCEDURE — 93017 CV STRESS TEST TRACING ONLY: CPT | Mod: PN

## 2025-08-26 RX ORDER — REGADENOSON 0.08 MG/ML
0.4 INJECTION, SOLUTION INTRAVENOUS ONCE
Status: COMPLETED | OUTPATIENT
Start: 2025-08-26 | End: 2025-08-26

## 2025-08-26 RX ADMIN — REGADENOSON 0.4 MG: 0.08 INJECTION, SOLUTION INTRAVENOUS at 09:08

## 2025-08-26 RX ADMIN — TETROFOSMIN 10 MILLICURIE: 1.38 INJECTION, POWDER, LYOPHILIZED, FOR SOLUTION INTRAVENOUS at 07:08

## 2025-08-26 RX ADMIN — TETROFOSMIN 30 MILLICURIE: 1.38 INJECTION, POWDER, LYOPHILIZED, FOR SOLUTION INTRAVENOUS at 09:08

## 2025-09-03 ENCOUNTER — PATIENT OUTREACH (OUTPATIENT)
Dept: ADMINISTRATIVE | Facility: HOSPITAL | Age: 50
End: 2025-09-03
Payer: MEDICARE

## 2025-09-05 ENCOUNTER — TELEPHONE (OUTPATIENT)
Dept: CARDIOLOGY | Facility: CLINIC | Age: 50
End: 2025-09-05
Payer: MEDICARE

## 2025-09-05 DIAGNOSIS — R07.2 PRECORDIAL CHEST PAIN: Primary | ICD-10-CM

## 2025-09-05 DIAGNOSIS — E78.2 MIXED HYPERLIPIDEMIA: ICD-10-CM
